# Patient Record
Sex: FEMALE | Race: WHITE | Employment: OTHER | URBAN - NONMETROPOLITAN AREA
[De-identification: names, ages, dates, MRNs, and addresses within clinical notes are randomized per-mention and may not be internally consistent; named-entity substitution may affect disease eponyms.]

---

## 2017-06-19 ENCOUNTER — OFFICE VISIT (OUTPATIENT)
Dept: OBGYN | Facility: OTHER | Age: 22
End: 2017-06-19
Attending: OBSTETRICS & GYNECOLOGY

## 2017-06-19 VITALS
WEIGHT: 166 LBS | HEIGHT: 62 IN | SYSTOLIC BLOOD PRESSURE: 100 MMHG | DIASTOLIC BLOOD PRESSURE: 64 MMHG | BODY MASS INDEX: 30.55 KG/M2 | HEART RATE: 80 BPM

## 2017-06-19 DIAGNOSIS — Z00.00 ROUTINE GENERAL MEDICAL EXAMINATION AT A HEALTH CARE FACILITY: Primary | ICD-10-CM

## 2017-06-19 PROBLEM — Z90.49 STATUS POST CHOLECYSTECTOMY: Status: ACTIVE | Noted: 2017-06-19

## 2017-06-19 PROCEDURE — 87491 CHLMYD TRACH DNA AMP PROBE: CPT | Mod: 90 | Performed by: OBSTETRICS & GYNECOLOGY

## 2017-06-19 PROCEDURE — 87624 HPV HI-RISK TYP POOLED RSLT: CPT | Mod: 90 | Performed by: OBSTETRICS & GYNECOLOGY

## 2017-06-19 PROCEDURE — 99395 PREV VISIT EST AGE 18-39: CPT | Performed by: OBSTETRICS & GYNECOLOGY

## 2017-06-19 PROCEDURE — 88142 CYTOPATH C/V THIN LAYER: CPT | Performed by: OBSTETRICS & GYNECOLOGY

## 2017-06-19 PROCEDURE — 87591 N.GONORRHOEAE DNA AMP PROB: CPT | Mod: 90 | Performed by: OBSTETRICS & GYNECOLOGY

## 2017-06-19 PROCEDURE — 99000 SPECIMEN HANDLING OFFICE-LAB: CPT | Performed by: OBSTETRICS & GYNECOLOGY

## 2017-06-19 NOTE — PROGRESS NOTES
ANNUAL    Christelle is a 22 year old  female who presents for annual exam.   yc 4  LC 7#  Gdm n  hpt n baby with hearing loss  No LMP recorded. Patient is not currently having periods (Reason: IUD).  Menses: reg very light  pain n Contraception  Mirena.  Planning pregnancy: n  Concerns in addition to routine health maintenance?  n.  GYNECOLOGIC HISTORY:   Surgery: n  History sexually transmitted infections:No STD history   Safe?  y  STI testing offered?  y  History of abnormal Pap smear: y  Problems with sex? n  Family history of: breast CA: pgm   Uterine n ovarian CA: n   colon CA: mu    HEALTH MAINTENANCE:  Cholesterol: (No results found for: CHOL History of abnormal lipids: n  Mammo: n . History of abnormal Mammo:n   Regular Self Breast Exams: y  Calcium/Vitamin D or Vit: n  Exercise n    TSH: (  TSH   Date Value Ref Range Status   2013 1.48 0.34 - 4.82 mU/L Final    )  Pap; (  Lab Results   Component Value Date    PAP LSIL 2015    PAP LSIL 10/19/2015    )    HISTORY:  Obstetric History       T1      L1     SAB0   TAB0   Ectopic0   Multiple0   Live Births0       # Outcome Date GA Lbr Blaine/2nd Weight Sex Delivery Anes PTL Lv   1 Term 13 38w6d 08:19 / :39 6 lb 13 oz (3.09 kg) M Vag-Spont Local,TOPICAL,EPI N LANCE      Name: SINDY CORNEJO      Apgar1:  9               Apgar5: 10        Past Medical History:   Diagnosis Date     Supervision of other normal pregnancy     NIRAJ: 7/3/2013     Past Surgical History:   Procedure Laterality Date     CHOLECYSTECTOMY       Family History   Problem Relation Age of Onset     Cystic Fibrosis       FAMILY H/O     Musculoskeletal Disorder       family h/o muscular dystrophy     DIABETES No family hx of      Hypertension No family hx of      C.A.D. No family hx of      CEREBROVASCULAR DISEASE No family hx of      Autoimmune Disease No family hx of      Colon Cancer No family hx of      Breast Cancer No family hx of      Ovarian Cancer No  "family hx of      Social History     Social History     Marital status: Single     Spouse name: N/A     Number of children: N/A     Years of education: N/A     Social History Main Topics     Smoking status: Never Smoker     Smokeless tobacco: Not on file     Alcohol use No     Drug use: No     Sexual activity: Not on file     Other Topics Concern     Not on file     Social History Narrative       Current Outpatient Prescriptions:      levonorgestrel (MIRENA) 20 MCG/24HR IUD, 1 each (20 mcg) by Intrauterine route, Disp: 1 each, Rfl: 0     diphenhydrAMINE (BENADRYL) 25 MG tablet, Take 1-2 tablets by mouth every 6 hours as needed for itching., Disp: 60 tablet, Rfl: 1     EPINEPHrine (EPIPEN IJ), Inject  as directed., Disp: , Rfl:    No Known Allergies    Past medical, surgical, social and family history were reviewed and updated in [facility].    ROS:    C:     NEGATIVE for fever, chills, change in weight  I:       NEGATIVE for worrisome rashes, moles or lesions  E:     NEGATIVE for vision changes or irritation  E/M: NEGATIVE for ear, mouth and throat problems  R:     NEGATIVE for significant cough or SOB  CV:   NEGATIVE for chest pain, palpitations or peripheral edema  GI:     NEGATIVE for nausea, abdominal pain, heartburn, or change in bowel habits  :   NEGATIVE for frequency, dysuria, hematuria, vaginal discharge, or irregular bleeding,incontinence   M:     NEGATIVE for significant arthralgias or myalgia  N:      NEGATIVE for weakness, dizziness or paresthesias  E:      NEGATIVE for temperature intolerance, skin/hair changes  P:      NEGATIVE for changes in mood or affect.     EXAM:   /64  Pulse 80  Ht 5' 2\" (1.575 m)  Wt 166 lb (75.3 kg)  BMI 30.36 kg/m2   BMI: Body mass index is 30.36 kg/(m^2).  Constitutional: healthy, alert and no distress  Head: Normocephalic. No masses, lesions, tenderness or abnormalities  Neck: Neck supple. Trachea midline. No adenopathy. Thyroid symmetric, normal size. "   Cardiovascular: RRR.   Respiratory: lungs clear   Breast: Breasts reveal mild symmetric fibrocystic densities, but there are no dominant, discrete, fixed or suspicious masses found.  Gastrointestinal: Abdomen soft, non-tender, non-distended. No masses, organomegaly.  :  Vulva:  No external lesions, normal female hair distribution, no inguinal adenopathy.    Urethra:  Midline, non-tender, well supported, no discharge  Vagina:  Moist, pink, no abnormal discharge, no lesions  Cervix: clean string in place  Uterus:   Normal size,  , non-tender, freely mobile  Ovaries:  No masses appreciated  Rectal Exam: not done  Musculoskeletal: extremities normal  Skin: no suspicious lesions or rashes  Psychiatric: Affect appropriate, cooperative,mentation appears normal.     COUNSELING:   regular exercise  healthy diet/nutrition  Immunizations   contraception  family planning  Folic Acid Counseling     reports that she has never smoked. She does not have any smokeless tobacco history on file.  Tobacco Cessation Action Plan:   Body mass index is 30.36 kg/(m^2).  Weight management plan:   FRAX Risk Assessment    ASSESSMENT:  22 year old female with satisfactory annual exam  With hx abnormal pap  PLAN:   Pap,gc,ct  CheckMIIC  rto 1 year     Greater than 0 minutes spent discussing other than annual     Bri Funez MD

## 2017-06-19 NOTE — MR AVS SNAPSHOT
After Visit Summary   6/19/2017    Christelle Bliss    MRN: [id number]           Patient Information     Date Of Birth          1995        Visit Information        Provider Department      6/19/2017 9:30 AM Bri Funez MD [facility]        Today's Diagnoses     Routine general medical examination at a health care facility    -  1      Care Instructions    Immunization History   Administered Date(s) Administered     DPT 1995, 1995, 1995     DTAP (<7y) 09/11/1997, 09/12/2001     Hepatitis B 1994, 10/10/1996, 01/09/1997, 12/11/1997     MMR 06/08/1995, 09/11/1997, 07/10/2006     OPV 01/11/1996, 08/08/1996, 10/10/1996, 01/09/1997     Pedvax-hib 09/01/1997     Poliovirus, inactivated (IPV) 1994, 1994, 09/12/2001     TDAP Vaccine (Adacel) 07/10/2006, 04/16/2013     TDAP Vaccine (Boostrix) 04/13/2013     TRIHIBIT (DTAP/HIB, <7y) 06/08/1995     Tdap (Adacel,Boostrix) 01/01/2006     Tetramune (DtP/HIB) 08/08/1996, 10/10/1996, 01/09/1997     Gardasil offered.    Pap test and Gonorrhea and Chlamydia testing done.  Return for annual exam in 1 year            Follow-ups after your visit        Who to contact     If you have questions or need follow up information about today's clinic visit or your schedule please contact [facility] directly at [phone].  Normal or non-critical lab and imaging results will be communicated to you by MyChart, letter or phone within 4 business days after the clinic has received the results. If you do not hear from us within 7 days, please contact the clinic through MyChart or phone. If you have a critical or abnormal lab result, we will notify you by phone as soon as possible.  Submit refill requests through [employer] or call your pharmacy and they will forward the refill request to us. Please allow 3 business days for your refill to be completed.          Additional Information About Your Visit       "  MyChart Information     [employer] lets you send messages to your doctor, view your test results, renew your prescriptions, schedule appointments and more. To sign up, go to [email or web address] . Click on \"Log in\" on the left side of the screen, which will take you to the Welcome page. Then click on \"Sign up Now\" on the right side of the page.     You will be asked to enter the access code listed below, as well as some personal information. Please follow the directions to create your username and password.     Your access code is: [email or web address]  Expires: 2017  9:25 AM     Your access code will  in 90 days. If you need help or a new code, please call your [facility] clinic or [phone].        Care EveryWhere ID     This is your Care EveryWhere ID. This could be used by other organizations to access your [facility] medical records  [id number]        Your Vitals Were     Pulse Height BMI (Body Mass Index)             80 5' 2\" (1.575 m) 30.36 kg/m2          Blood Pressure from Last 3 Encounters:   17 100/64   11/23/15 90/64   10/19/15 114/72    Weight from Last 3 Encounters:   17 166 lb (75.3 kg)   11/23/15 167 lb (75.8 kg)   10/19/15 167 lb (75.8 kg)              We Performed the Following     A pap thin layer screen reflex to HPV if ASCUS - recommend age 25 - 29     CHLAMYDIA TRACHOMATIS PCR     NEISSERIA GONORRHOEA PCR        Primary Care Provider    [facility] Other Clinic                Thank you!     Thank you for choosing [facility] [place]  for your care. Our goal is always to provide you with excellent care. Hearing back from our patients is one way we can continue to improve our services. Please take a few minutes to complete the written survey that you may receive in the mail after your visit with us. Thank you!             Your Updated Medication List - Protect others around you: Learn how to safely use, store and throw away your medicines at www.disposemymeds.org.          This list " is accurate as of: 6/19/17 10:10 AM.  Always use your most recent med list.                   Brand Name Dispense Instructions for use    diphenhydrAMINE 25 MG tablet    BENADRYL    60 tablet    Take 1-2 tablets by mouth every 6 hours as needed for itching.       EPIPEN IJ      Inject  as directed.       levonorgestrel 20 MCG/24HR IUD    MIRENA    1 each    1 each (20 mcg) by Intrauterine route

## 2017-06-19 NOTE — PATIENT INSTRUCTIONS
Immunization History   Administered Date(s) Administered     DPT 1994, 1994, 1994     DTAP (<7y) 09/11/1997, 09/12/2001     Hepatitis B 1994, 10/10/1996, 01/09/1997, 12/11/1997     MMR 06/08/1995, 09/11/1997, 07/10/2006     OPV 01/11/1996, 08/08/1996, 10/10/1996, 01/09/1997     Pedvax-hib 09/01/1997     Poliovirus, inactivated (IPV) 1994, 1994, 09/12/2001     TDAP Vaccine (Adacel) 07/10/2006, 04/16/2013     TDAP Vaccine (Boostrix) 04/13/2013     TRIHIBIT (DTAP/HIB, <7y) 06/08/1995     Tdap (Adacel,Boostrix) 01/01/2006     Tetramune (DtP/HIB) 08/08/1996, 10/10/1996, 01/09/1997     Gardasil offered.    Pap test and Gonorrhea and Chlamydia testing done.  Return for annual exam in 1 year

## 2017-06-20 LAB
C TRACH DNA SPEC QL NAA+PROBE: NORMAL
N GONORRHOEA DNA SPEC QL NAA+PROBE: NORMAL
SPECIMEN SOURCE: NORMAL
SPECIMEN SOURCE: NORMAL

## 2017-06-23 ASSESSMENT — ANXIETY QUESTIONNAIRES
6. BECOMING EASILY ANNOYED OR IRRITABLE: NOT AT ALL
7. FEELING AFRAID AS IF SOMETHING AWFUL MIGHT HAPPEN: NOT AT ALL
5. BEING SO RESTLESS THAT IT IS HARD TO SIT STILL: NOT AT ALL
3. WORRYING TOO MUCH ABOUT DIFFERENT THINGS: SEVERAL DAYS
GAD7 TOTAL SCORE: 3
IF YOU CHECKED OFF ANY PROBLEMS ON THIS QUESTIONNAIRE, HOW DIFFICULT HAVE THESE PROBLEMS MADE IT FOR YOU TO DO YOUR WORK, TAKE CARE OF THINGS AT HOME, OR GET ALONG WITH OTHER PEOPLE: NOT DIFFICULT AT ALL
1. FEELING NERVOUS, ANXIOUS, OR ON EDGE: SEVERAL DAYS
2. NOT BEING ABLE TO STOP OR CONTROL WORRYING: NOT AT ALL

## 2017-06-23 ASSESSMENT — PATIENT HEALTH QUESTIONNAIRE - PHQ9: 5. POOR APPETITE OR OVEREATING: SEVERAL DAYS

## 2017-06-24 ASSESSMENT — PATIENT HEALTH QUESTIONNAIRE - PHQ9: SUM OF ALL RESPONSES TO PHQ QUESTIONS 1-9: 2

## 2017-06-24 ASSESSMENT — ANXIETY QUESTIONNAIRES: GAD7 TOTAL SCORE: 3

## 2017-06-26 PROBLEM — R87.610 PAPANICOLAOU SMEAR OF CERVIX WITH ATYPICAL SQUAMOUS CELLS OF UNDETERMINED SIGNIFICANCE (ASC-US): Status: ACTIVE | Noted: 2017-06-26

## 2017-06-26 LAB
COPATH REPORT: ABNORMAL
PAP: ABNORMAL

## 2017-06-27 LAB
FINAL DIAGNOSIS: NORMAL
HPV HR 12 DNA CVX QL NAA+PROBE: NEGATIVE
HPV16 DNA SPEC QL NAA+PROBE: NEGATIVE
HPV18 DNA SPEC QL NAA+PROBE: NEGATIVE
SPECIMEN DESCRIPTION: NORMAL

## 2017-11-07 ENCOUNTER — OFFICE VISIT (OUTPATIENT)
Dept: OBGYN | Facility: OTHER | Age: 22
End: 2017-11-07
Attending: OBSTETRICS & GYNECOLOGY

## 2017-11-07 VITALS
DIASTOLIC BLOOD PRESSURE: 70 MMHG | SYSTOLIC BLOOD PRESSURE: 110 MMHG | HEART RATE: 71 BPM | BODY MASS INDEX: 30.73 KG/M2 | WEIGHT: 168 LBS | OXYGEN SATURATION: 99 %

## 2017-11-07 DIAGNOSIS — Z30.09 FAMILY PLANNING: Primary | ICD-10-CM

## 2017-11-07 DIAGNOSIS — Z23 NEED FOR PROPHYLACTIC VACCINATION AND INOCULATION AGAINST INFLUENZA: ICD-10-CM

## 2017-11-07 PROCEDURE — 58301 REMOVE INTRAUTERINE DEVICE: CPT | Performed by: OBSTETRICS & GYNECOLOGY

## 2017-11-07 PROCEDURE — 90471 IMMUNIZATION ADMIN: CPT | Performed by: OBSTETRICS & GYNECOLOGY

## 2017-11-07 PROCEDURE — 99213 OFFICE O/P EST LOW 20 MIN: CPT | Mod: 25 | Performed by: OBSTETRICS & GYNECOLOGY

## 2017-11-07 PROCEDURE — 90686 IIV4 VACC NO PRSV 0.5 ML IM: CPT | Performed by: OBSTETRICS & GYNECOLOGY

## 2017-11-07 ASSESSMENT — PAIN SCALES - GENERAL: PAINLEVEL: NO PAIN (0)

## 2017-11-07 NOTE — MR AVS SNAPSHOT
"              After Visit Summary   11/7/2017    Christelle Bliss    MRN: [id number]           Patient Information     Date Of Birth          1995        Visit Information        Provider Department      11/7/2017 10:10 AM Bri Funez MD [facility] [place]        Today's Diagnoses     Family planning    -  1       Follow-ups after your visit        Your next 10 appointments already scheduled     Aug 28, 2018 10:00 AM CDT   (Arrive by 9:45 AM)   PHYSICAL with Bri Funez MD   [facility] ([facility] )    [address] [place] [address]   [phone]              Who to contact     If you have questions or need follow up information about today's clinic visit or your schedule please contact [facility] directly at [phone].  Normal or non-critical lab and imaging results will be communicated to you by MyChart, letter or phone within 4 business days after the clinic has received the results. If you do not hear from us within 7 days, please contact the clinic through MyChart or phone. If you have a critical or abnormal lab result, we will notify you by phone as soon as possible.  Submit refill requests through [employer] or call your pharmacy and they will forward the refill request to us. Please allow 3 business days for your refill to be completed.          Additional Information About Your Visit        MyChart Information     [employer] lets you send messages to your doctor, view your test results, renew your prescriptions, schedule appointments and more. To sign up, go to [email or web address] . Click on \"Log in\" on the left side of the screen, which will take you to the Welcome page. Then click on \"Sign up Now\" on the right side of the page.     You will be asked to enter the access code listed below, as well as some personal information. Please follow the directions to create your username and password.     Your access code is: " [email or web address]  Expires: 2018 10:44 AM     Your access code will  in 90 days. If you need help or a new code, please call your [facility] clinic or [phone].        Care EveryWhere ID     This is your Care EveryWhere ID. This could be used by other organizations to access your [facility] medical records  [id number]        Your Vitals Were     Pulse Pulse Oximetry BMI (Body Mass Index)             71 99% 30.73 kg/m2          Blood Pressure from Last 3 Encounters:   17 110/70   17 100/64   11/23/15 90/64    Weight from Last 3 Encounters:   17 168 lb (76.2 kg)   17 166 lb (75.3 kg)   11/23/15 167 lb (75.8 kg)              We Performed the Following     REMOVE INTRAUTERINE DEVICE        Primary Care Provider    Physician No Ref-Primary       NO REF-PRIMARY PHYSICIAN        Equal Access to Services     HORTENCIA LLOYD : Hadii aad ku hadasho Somalissa, waaxda luqadaha, qaybta kaalmada adeegyada, waxay celsoin jaqueline hood . So [facility] [phone].    ATENCIÓN: Si habla español, tiene a casper disposición servicios gratuitos de asistencia lingüística. Julien al [phone].    We comply with applicable federal civil rights laws and [place] laws. We do not discriminate on the basis of race, color, national origin, age, disability, sex, sexual orientation, or gender identity.            Thank you!     Thank you for choosing [facility] [place]  for your care. Our goal is always to provide you with excellent care. Hearing back from our patients is one way we can continue to improve our services. Please take a few minutes to complete the written survey that you may receive in the mail after your visit with us. Thank you!             Your Updated Medication List - Protect others around you: Learn how to safely use, store and throw away your medicines at www.disposemymeds.org.          This list is accurate as of: 17 10:44 AM.  Always use your most recent med list.                    Brand Name Dispense Instructions for use Diagnosis    diphenhydrAMINE 25 MG tablet    BENADRYL    60 tablet    Take 1-2 tablets by mouth every 6 hours as needed for itching.    Contact dermatitis       EPIPEN IJ      Inject  as directed.        levonorgestrel 20 MCG/24HR IUD    MIRENA    1 each    1 each (20 mcg) by Intrauterine route    Family planning,  (spontaneous vaginal delivery)

## 2017-11-07 NOTE — PROGRESS NOTES

## 2017-11-07 NOTE — PROGRESS NOTES
Christelle Bliss is a 22 year old female here for planning pregnancy  Folate discussed      O:   /70  Pulse 71  Wt 168 lb (76.2 kg)  SpO2 99%  BMI 30.73 kg/m2   aa  Procedure:  After verbal consent was obtained from the patient, IUD strings were grasped with ring forcep and IUD easily removed intact with minimal patient discomfort noted.  No bleeding noted.      A:  Family planning    P:  rto pregnant  Folate  Flu shot    Greater than 15 minutes were spent face to face counseling this patient in addition to procedure    Bri Funez MD

## 2017-11-07 NOTE — NURSING NOTE
"Chief Complaint   Patient presents with     planning pregnancy       Initial /70  Pulse 71  Wt 168 lb (76.2 kg)  SpO2 99%  BMI 30.73 kg/m2 Estimated body mass index is 30.73 kg/(m^2) as calculated from the following:    Height as of 6/19/17: 5' 2\" (1.575 m).    Weight as of this encounter: 168 lb (76.2 kg).  Medication Reconciliation: complete     Shanna Fountain      "

## 2018-08-30 ENCOUNTER — OFFICE VISIT (OUTPATIENT)
Dept: OBGYN | Facility: OTHER | Age: 23
End: 2018-08-30
Attending: ADVANCED PRACTICE MIDWIFE
Payer: MEDICAID

## 2018-08-30 VITALS
DIASTOLIC BLOOD PRESSURE: 70 MMHG | WEIGHT: 174 LBS | HEIGHT: 62 IN | BODY MASS INDEX: 32.02 KG/M2 | SYSTOLIC BLOOD PRESSURE: 102 MMHG

## 2018-08-30 DIAGNOSIS — Z12.4 ENCOUNTER FOR SCREENING FOR CERVICAL CANCER: ICD-10-CM

## 2018-08-30 DIAGNOSIS — Z01.419 WELL WOMAN EXAM WITH ROUTINE GYNECOLOGICAL EXAM: Primary | ICD-10-CM

## 2018-08-30 LAB
HCG UR QL: NEGATIVE
TSH SERPL DL<=0.005 MIU/L-ACNC: 1.48 MU/L (ref 0.4–4)

## 2018-08-30 PROCEDURE — 84443 ASSAY THYROID STIM HORMONE: CPT | Mod: ZL

## 2018-08-30 PROCEDURE — 36415 COLL VENOUS BLD VENIPUNCTURE: CPT | Mod: ZL

## 2018-08-30 PROCEDURE — G0123 SCREEN CERV/VAG THIN LAYER: HCPCS | Mod: ZL

## 2018-08-30 PROCEDURE — 81025 URINE PREGNANCY TEST: CPT | Mod: ZL

## 2018-08-30 PROCEDURE — 99385 PREV VISIT NEW AGE 18-39: CPT | Performed by: ADVANCED PRACTICE MIDWIFE

## 2018-08-30 PROCEDURE — G0463 HOSPITAL OUTPT CLINIC VISIT: HCPCS

## 2018-08-30 PROCEDURE — 99212 OFFICE O/P EST SF 10 MIN: CPT | Mod: 25 | Performed by: ADVANCED PRACTICE MIDWIFE

## 2018-08-30 RX ORDER — PRENATAL VIT/IRON FUM/FOLIC AC 27MG-0.8MG
1 TABLET ORAL DAILY
COMMUNITY

## 2018-08-30 ASSESSMENT — ANXIETY QUESTIONNAIRES
2. NOT BEING ABLE TO STOP OR CONTROL WORRYING: NOT AT ALL
3. WORRYING TOO MUCH ABOUT DIFFERENT THINGS: SEVERAL DAYS
5. BEING SO RESTLESS THAT IT IS HARD TO SIT STILL: NOT AT ALL
IF YOU CHECKED OFF ANY PROBLEMS ON THIS QUESTIONNAIRE, HOW DIFFICULT HAVE THESE PROBLEMS MADE IT FOR YOU TO DO YOUR WORK, TAKE CARE OF THINGS AT HOME, OR GET ALONG WITH OTHER PEOPLE: NOT DIFFICULT AT ALL
6. BECOMING EASILY ANNOYED OR IRRITABLE: NOT AT ALL
1. FEELING NERVOUS, ANXIOUS, OR ON EDGE: SEVERAL DAYS
7. FEELING AFRAID AS IF SOMETHING AWFUL MIGHT HAPPEN: SEVERAL DAYS
GAD7 TOTAL SCORE: 3
4. TROUBLE RELAXING: NOT AT ALL

## 2018-08-30 ASSESSMENT — PAIN SCALES - GENERAL: PAINLEVEL: NO PAIN (0)

## 2018-08-30 NOTE — PROGRESS NOTES
ANNUAL    Christelle is a 23 year old  female who presents for annual exam.   Youngest child 5  Largest Child 6 lb 14 oz  GDM n  Hypertension n  Patient's last menstrual period was 2018.  Menses:  Cycles 30-34 days When did they start 13  How many days bleed 5 days with 2-3 heavy pain mild cramps Contraception nothing.  Planning pregnancy: y  Concerns in addition to routine health maintenance?  fertility.  GYNECOLOGIC HISTORY:   Surgery: n  History sexually transmitted infections:No STD history   Safe?  y  STI testing offered?  y  History of abnormal Pap smear: n  Problems with sex? (bleeding/pain) n  Family history of: breast cancer: pgm   Uterine cancer: n ovarian cancer: n   colon cancer: n    HEALTH MAINTENANCE:  Cholesterol: (No results found for: CHOL History of abnormal lipids: n  Mammo: n . History of abnormal Mammo:na   Regular Self Breast Exams: y Calcium/Vitamin D or Vitamin: y Exercise n    TSH: (  TSH   Date Value Ref Range Status   2013 1.48 0.34 - 4.82 mU/L Final    )  Pap; (  Lab Results   Component Value Date    PAP ASC-US 2017    PAP LSIL 2015    PAP LSIL 10/19/2015    )    HISTORY:  Obstetric History       T1      L1     SAB0   TAB0   Ectopic0   Multiple0   Live Births1       # Outcome Date GA Lbr Blaine/2nd Weight Sex Delivery Anes PTL Lv   1 Term 13 38w6d 08:19 :39 6 lb 13 oz (3.09 kg) M Vag-Spont Local,TOPICAL,EPI N LANCE      Name: SINDY CORNEJO      Apgar1:  9               Apgar5: 10        Past Medical History:   Diagnosis Date     Supervision of other normal pregnancy     NIRAJ: 7/3/2013     Past Surgical History:   Procedure Laterality Date     CHOLECYSTECTOMY       Family History   Problem Relation Age of Onset     Cystic Fibrosis       FAMILY H/O     Musculoskeletal Disorder       family h/o muscular dystrophy     Diabetes No family hx of      Hypertension No family hx of      C.A.D. No family hx of      Cerebrovascular Disease No  family hx of      Autoimmune Disease No family hx of      Colon Cancer No family hx of      Breast Cancer No family hx of      Ovarian Cancer No family hx of      Social History     Social History     Marital status: Single     Spouse name: N/A     Number of children: N/A     Years of education: N/A     Social History Main Topics     Smoking status: Never Smoker     Smokeless tobacco: None     Alcohol use No     Drug use: No     Sexual activity: Not Asked     Other Topics Concern     None     Social History Narrative       Current Outpatient Prescriptions:      diphenhydrAMINE (BENADRYL) 25 MG tablet, Take 1-2 tablets by mouth every 6 hours as needed for itching., Disp: 60 tablet, Rfl: 1     EPINEPHrine (EPIPEN IJ), Inject  as directed., Disp: , Rfl:      Prenatal Vit-Fe Fumarate-FA (PRENATAL MULTIVITAMIN PLUS IRON) 27-0.8 MG TABS per tablet, Take 1 tablet by mouth daily, Disp: , Rfl:      Allergies   Allergen Reactions     Trees Difficulty breathing       Past medical, surgical, social and family history were reviewed and updated in [facility].    ROS:    CONSTITUTIONAL:     NEGATIVE for fever, chills, change in weight  INTEGUMENTARY/SKIN:       NEGATIVE for worrisome rashes, moles or lesions  EYES:     NEGATIVE for vision changes or irritation  ENT/MOUTH: NEGATIVE for ear, mouth and throat problems  RESP:     NEGATIVE for significant cough or SOB  CV:   NEGATIVE for chest pain, palpitations or peripheral edema  GI:     NEGATIVE for nausea, abdominal pain, heartburn, or change in bowel habits  :   NEGATIVE for frequency, dysuria, hematuria, vaginal discharge, or irregular bleeding,incontinence   MUSCULOSKELETAL:     NEGATIVE for significant arthralgias or myalgia  NEURO:      NEGATIVE for weakness, dizziness or paresthesias  ENDOCRINE:      NEGATIVE for temperature intolerance, skin/hair changes  PSYCHIATRIC:      NEGATIVE for changes in mood or affect.     EXAM:   /70 (BP Location: Right arm, Patient Position:  "Chair, Cuff Size: Adult Regular)  Ht 5' 2\" (1.575 m)  Wt 174 lb (78.9 kg)  LMP 08/06/2018  BMI 31.83 kg/m2   BMI: Body mass index is 31.83 kg/(m^2).  Constitutional: healthy, alert and no distress  Head: Normocephalic. No masses, lesions, tenderness or abnormalities  Neck: Neck supple. Trachea midline. No adenopathy. Thyroid symmetric, normal size.   Cardiovascular: RRR.   Respiratory: lungs clear   Breast: Breasts reveal mild symmetric fibrocystic densities, but there are no dominant, discrete, fixed or suspicious masses found.  Gastrointestinal: Abdomen soft, non-tender, non-distended. No masses, organomegaly.  :  Vulva:  No external lesions, normal female hair distribution, no inguinal adenopathy.    Urethra:  Midline, non-tender, well supported, no discharge  Vagina:  Moist, pink, no abnormal discharge, no lesions  Cervix: clean   Uterus:  Normal size, non-tender, freely mobile  Ovaries:  No masses appreciated  Rectal Exam: deferred  Musculoskeletal: extremities normal  Skin: no suspicious lesions or rashes  Psychiatric: Affect appropriate, cooperative,mentation appears normal.     COUNSELING:   regular exercise  healthy diet/nutrition  Immunizations   contraception  family planning  Folic Acid Counseling     reports that she has never smoked. She does not have any smokeless tobacco history on file.  Tobacco Cessation Action Plan: na  Body mass index is 31.83 kg/(m^2).  Weight management plan: Lower carb intake and increase exercise  FRAX Risk Assessment    ASSESSMENT:  23 year old female with satisfactory annual exam  Need of cervical cancer screening  Need breast cancer screening  No contraception  Desires pregnancy  Infertility concerns/Trying since November, 2017  Obesity  PLAN:   Pap with reflex High Risk hpv  Tsh  Urine hCG qualitative  Provider breast exam  Preconception education  Discussed weight management plan/Goal 10% loss  RTO in November for follow up if needed    Return to office: 1 year for " well woman and as needed    Total visit greater than 45 minutes 35 minutes spent discussing well woman care and prevention and 10 minutes spent discussing preconception planning including avoiding medication, drugs, and alcohol, undercooked meat, unpasteurized milk and cheeses, do not change cat litter, wear gloves for gardening, wash hands after gardening.  Take folic acid daily, weight loss management, schedule appointment after positive pregnancy test for prenatal care.     MAYCO Manley, CNM

## 2018-08-30 NOTE — PATIENT INSTRUCTIONS
Return to office in one year for well woman visit and as needed.    Thank you for allowing Wade MAO CNM and our OB team to participate in your care.  If you have a scheduling or an appointment question please contact [facility] Unit Coordinator at their direct line [phone].   ALL nursing questions or concerns can be directed to your OB nurse at: [phone] Liz Stephenson/Summer

## 2018-08-30 NOTE — NURSING NOTE
"Chief Complaint   Patient presents with     Gyn Exam       Initial /70 (BP Location: Right arm, Patient Position: Chair, Cuff Size: Adult Regular)  Ht 5' 2\" (1.575 m)  Wt 174 lb (78.9 kg)  LMP 08/06/2018  BMI 31.83 kg/m2 Estimated body mass index is 31.83 kg/(m^2) as calculated from the following:    Height as of this encounter: 5' 2\" (1.575 m).    Weight as of this encounter: 174 lb (78.9 kg).  Medication Reconciliation: complete    Seema Silva LPN    "

## 2018-08-31 ASSESSMENT — PATIENT HEALTH QUESTIONNAIRE - PHQ9: SUM OF ALL RESPONSES TO PHQ QUESTIONS 1-9: 2

## 2018-08-31 ASSESSMENT — ANXIETY QUESTIONNAIRES: GAD7 TOTAL SCORE: 3

## 2018-09-08 LAB
COPATH REPORT: NORMAL
PAP: NORMAL

## 2018-10-10 ENCOUNTER — TELEPHONE (OUTPATIENT)
Dept: OBGYN | Facility: OTHER | Age: 23
End: 2018-10-10

## 2018-10-10 DIAGNOSIS — Z32.01 PREGNANCY TEST POSITIVE: Primary | ICD-10-CM

## 2018-10-10 NOTE — TELEPHONE ENCOUNTER
Wade Bauer Patient:     Positive pregnancy test : Yes  Last Annual/ Pap: 2018      P:1  Vaginal or C/S:vaginal   LMP : 2018 GA: 4w3d  Prenatal vitamins?: Yes  Bleeding?: No  Cramping?: Yes, cramping on right side   1-sided pelvic pain?: No   Advised patient to be seen ASAP if any of the above symptoms.    Order pended for dating US.     Amarjit appt scheduled with Wade on @ 3:45

## 2018-10-30 ENCOUNTER — HOSPITAL ENCOUNTER (OUTPATIENT)
Dept: ULTRASOUND IMAGING | Facility: HOSPITAL | Age: 23
Discharge: HOME | End: 2018-10-30
Attending: ADVANCED PRACTICE MIDWIFE | Admitting: ADVANCED PRACTICE MIDWIFE
Payer: COMMERCIAL

## 2018-10-30 DIAGNOSIS — Z32.01 PREGNANCY TEST POSITIVE: ICD-10-CM

## 2018-10-30 PROCEDURE — 76801 OB US < 14 WKS SINGLE FETUS: CPT | Mod: TC

## 2018-11-20 ENCOUNTER — APPOINTMENT (OUTPATIENT)
Dept: LAB | Facility: OTHER | Age: 23
End: 2018-11-20
Attending: ADVANCED PRACTICE MIDWIFE
Payer: COMMERCIAL

## 2018-11-20 ENCOUNTER — PRENATAL OFFICE VISIT (OUTPATIENT)
Dept: OBGYN | Facility: OTHER | Age: 23
End: 2018-11-20
Attending: ADVANCED PRACTICE MIDWIFE
Payer: COMMERCIAL

## 2018-11-20 VITALS
SYSTOLIC BLOOD PRESSURE: 107 MMHG | BODY MASS INDEX: 32.39 KG/M2 | HEIGHT: 62 IN | WEIGHT: 176 LBS | DIASTOLIC BLOOD PRESSURE: 66 MMHG

## 2018-11-20 DIAGNOSIS — Z34.81 ENCOUNTER FOR SUPERVISION OF OTHER NORMAL PREGNANCY IN FIRST TRIMESTER: Primary | ICD-10-CM

## 2018-11-20 DIAGNOSIS — Z23 NEED FOR PROPHYLACTIC VACCINATION AND INOCULATION AGAINST INFLUENZA: ICD-10-CM

## 2018-11-20 DIAGNOSIS — Z34.80 ENCOUNTER FOR SUPERVISION OF OTHER NORMAL PREGNANCY, UNSPECIFIED TRIMESTER: ICD-10-CM

## 2018-11-20 DIAGNOSIS — R82.5 POSITIVE URINE DRUG SCREEN: ICD-10-CM

## 2018-11-20 LAB
ALBUMIN UR-MCNC: NEGATIVE MG/DL
APPEARANCE UR: CLEAR
BILIRUB UR QL STRIP: NEGATIVE
COLOR UR AUTO: YELLOW
ERYTHROCYTE [DISTWIDTH] IN BLOOD BY AUTOMATED COUNT: 12.5 % (ref 10–15)
GLUCOSE UR STRIP-MCNC: NEGATIVE MG/DL
HCT VFR BLD AUTO: 37.5 % (ref 35–47)
HGB BLD-MCNC: 12.7 G/DL (ref 11.7–15.7)
HGB UR QL STRIP: ABNORMAL
KETONES UR STRIP-MCNC: NEGATIVE MG/DL
LEUKOCYTE ESTERASE UR QL STRIP: NEGATIVE
MCH RBC QN AUTO: 31.4 PG (ref 26.5–33)
MCHC RBC AUTO-ENTMCNC: 33.9 G/DL (ref 31.5–36.5)
MCV RBC AUTO: 93 FL (ref 78–100)
NITRATE UR QL: NEGATIVE
PH UR STRIP: 6 PH (ref 5–7)
PLATELET # BLD AUTO: 267 10E9/L (ref 150–450)
RBC # BLD AUTO: 4.04 10E12/L (ref 3.8–5.2)
RBC #/AREA URNS AUTO: NORMAL /HPF
SOURCE: ABNORMAL
SP GR UR STRIP: 1.02 (ref 1–1.03)
SPECIMEN SOURCE: ABNORMAL
UROBILINOGEN UR STRIP-ACNC: 0.2 EU/DL (ref 0.2–1)
WBC # BLD AUTO: 8.1 10E9/L (ref 4–11)
WBC #/AREA URNS AUTO: NORMAL /HPF
WET PREP SPEC: ABNORMAL

## 2018-11-20 PROCEDURE — 90686 IIV4 VACC NO PRSV 0.5 ML IM: CPT | Performed by: ADVANCED PRACTICE MIDWIFE

## 2018-11-20 PROCEDURE — G0463 HOSPITAL OUTPT CLINIC VISIT: HCPCS

## 2018-11-20 PROCEDURE — G0499 HEPB SCREEN HIGH RISK INDIV: HCPCS | Mod: ZL | Performed by: ADVANCED PRACTICE MIDWIFE

## 2018-11-20 PROCEDURE — 86780 TREPONEMA PALLIDUM: CPT | Mod: ZL | Performed by: ADVANCED PRACTICE MIDWIFE

## 2018-11-20 PROCEDURE — 99207 ZZC FIRST OB VISIT: CPT | Performed by: ADVANCED PRACTICE MIDWIFE

## 2018-11-20 PROCEDURE — 85027 COMPLETE CBC AUTOMATED: CPT | Mod: ZL | Performed by: ADVANCED PRACTICE MIDWIFE

## 2018-11-20 PROCEDURE — G0463 HOSPITAL OUTPT CLINIC VISIT: HCPCS | Mod: 25

## 2018-11-20 PROCEDURE — 87591 N.GONORRHOEAE DNA AMP PROB: CPT | Mod: ZL | Performed by: ADVANCED PRACTICE MIDWIFE

## 2018-11-20 PROCEDURE — 86901 BLOOD TYPING SEROLOGIC RH(D): CPT | Mod: ZL | Performed by: ADVANCED PRACTICE MIDWIFE

## 2018-11-20 PROCEDURE — 86850 RBC ANTIBODY SCREEN: CPT | Mod: ZL | Performed by: ADVANCED PRACTICE MIDWIFE

## 2018-11-20 PROCEDURE — 99000 SPECIMEN HANDLING OFFICE-LAB: CPT | Performed by: ADVANCED PRACTICE MIDWIFE

## 2018-11-20 PROCEDURE — 86762 RUBELLA ANTIBODY: CPT | Mod: ZL | Performed by: ADVANCED PRACTICE MIDWIFE

## 2018-11-20 PROCEDURE — 87389 HIV-1 AG W/HIV-1&-2 AB AG IA: CPT | Mod: ZL | Performed by: ADVANCED PRACTICE MIDWIFE

## 2018-11-20 PROCEDURE — 36415 COLL VENOUS BLD VENIPUNCTURE: CPT | Mod: ZL | Performed by: ADVANCED PRACTICE MIDWIFE

## 2018-11-20 PROCEDURE — 87491 CHLMYD TRACH DNA AMP PROBE: CPT | Mod: ZL | Performed by: ADVANCED PRACTICE MIDWIFE

## 2018-11-20 PROCEDURE — 81001 URINALYSIS AUTO W/SCOPE: CPT | Mod: ZL | Performed by: ADVANCED PRACTICE MIDWIFE

## 2018-11-20 PROCEDURE — 90471 IMMUNIZATION ADMIN: CPT | Performed by: ADVANCED PRACTICE MIDWIFE

## 2018-11-20 PROCEDURE — 80349 CANNABINOIDS NATURAL: CPT | Mod: ZL | Performed by: ADVANCED PRACTICE MIDWIFE

## 2018-11-20 PROCEDURE — 80307 DRUG TEST PRSMV CHEM ANLYZR: CPT | Mod: ZL | Performed by: ADVANCED PRACTICE MIDWIFE

## 2018-11-20 PROCEDURE — 86900 BLOOD TYPING SEROLOGIC ABO: CPT | Mod: ZL | Performed by: ADVANCED PRACTICE MIDWIFE

## 2018-11-20 PROCEDURE — 87210 SMEAR WET MOUNT SALINE/INK: CPT | Mod: ZL | Performed by: ADVANCED PRACTICE MIDWIFE

## 2018-11-20 ASSESSMENT — ANXIETY QUESTIONNAIRES
1. FEELING NERVOUS, ANXIOUS, OR ON EDGE: NOT AT ALL
7. FEELING AFRAID AS IF SOMETHING AWFUL MIGHT HAPPEN: NOT AT ALL
5. BEING SO RESTLESS THAT IT IS HARD TO SIT STILL: NOT AT ALL
2. NOT BEING ABLE TO STOP OR CONTROL WORRYING: NOT AT ALL
4. TROUBLE RELAXING: NOT AT ALL
3. WORRYING TOO MUCH ABOUT DIFFERENT THINGS: NOT AT ALL
6. BECOMING EASILY ANNOYED OR IRRITABLE: NOT AT ALL
GAD7 TOTAL SCORE: 0

## 2018-11-20 ASSESSMENT — PATIENT HEALTH QUESTIONNAIRE - PHQ9: SUM OF ALL RESPONSES TO PHQ QUESTIONS 1-9: 3

## 2018-11-20 ASSESSMENT — PAIN SCALES - GENERAL: PAINLEVEL: NO PAIN (0)

## 2018-11-20 NOTE — PATIENT INSTRUCTIONS
Return to office in 4 weeks for prenatal care and as needed.    Thank you for allowing Wade MAO CNM and our OB team to participate in your care.  If you have a scheduling or an appointment question please contact [facility] Unit Coordinator at their direct line [phone].   ALL nursing questions or concerns can be directed to your OB nurse at: [phone] Liz Stephenson/Summer

## 2018-11-20 NOTE — MR AVS SNAPSHOT
After Visit Summary   11/20/2018    Christelle Bliss    MRN: [id number]           Patient Information     Date Of Birth          1995        Visit Information        Provider Department      11/20/2018 3:45 PM Wade Bauer APRN CNM [facility]        Today's Diagnoses     Encounter for supervision of other normal pregnancy in first trimester    -  1    Encounter for supervision of other normal pregnancy, unspecified trimester        Need for prophylactic vaccination and inoculation against influenza          Care Instructions    Return to office in 4 weeks for prenatal care and as needed.    Thank you for allowing Wade MAO CNM and our OB team to participate in your care.  If you have a scheduling or an appointment question please contact [facility] Unit Coordinator at their direct line [phone].   ALL nursing questions or concerns can be directed to your OB nurse at: [phone] - Seema/Summer               Follow-ups after your visit        Your next 10 appointments already scheduled     Dec 11, 2018  4:00 PM CST   (Arrive by 3:45 PM)   ESTABLISHED PRENATAL with MAYCO Smith CNM   [facility] ([facility] )    [address] [address] [phone]              Who to contact     If you have questions or need follow up information about today's clinic visit or your schedule please contact [facility] directly at [phone].  Normal or non-critical lab and imaging results will be communicated to you by MyChart, letter or phone within 4 business days after the clinic has received the results. If you do not hear from us within 7 days, please contact the clinic through MyChart or phone. If you have a critical or abnormal lab result, we will notify you by phone as soon as possible.  Submit refill requests through [employer] or call your pharmacy  "and they will forward the refill request to us. Please allow 3 business days for your refill to be completed.          Additional Information About Your Visit        Care EveryWhere ID     This is your Care EveryWhere ID. This could be used by other organizations to access your [place] medical records  [id number]        Your Vitals Were     Height Last Period BMI (Body Mass Index)             5' 2\" (1.575 m) 09/09/2018 32.19 kg/m2          Blood Pressure from Last 3 Encounters:   11/20/18 107/66   08/30/18 102/70   11/07/17 110/70    Weight from Last 3 Encounters:   11/20/18 176 lb (79.8 kg)   08/30/18 174 lb (78.9 kg)   11/07/17 168 lb (76.2 kg)              We Performed the Following     *UA reflex to Microscopic and Culture - [facility]/[place]     ABO/Rh type and screen     ADMIN 1st VACCINE     CBC with platelets     Drug Screen Urine (Range)     GC/Chlamydia by PCR - [facility]     HC FLU VAC PRESRV FREE QUAD SPLIT VIR 3+YRS IM     Hepatitis B surface antigen     HIV Antigen Antibody Combo     Rubella Antibody IgG Quantitative     Treponema Abs w Reflex to RPR and Titer     Urine Microscopic     Wet prep        Primary Care Provider Fax #    Physician No Ref-Primary [phone]       No address on file        Equal Access to Services     HORTENCIA LLOYD AH: Hadii alexys ku dietero Somartyali, waaxda luqadaha, qaybta kaalmada adeegyada, nabeel lane. So [facility] [phone].    ATENCIÓN: Si habla español, tiene a casper disposición servicios gratuitos de asistencia lingüística. Llame al [phone].    We comply with applicable federal civil rights laws and [place] laws. We do not discriminate on the basis of race, color, national origin, age, disability, sex, sexual orientation, or gender identity.            Thank you!     Thank you for choosing [facility]  for your care. Our goal is always to provide you with excellent care. Hearing back from our patients is one way we can " continue to improve our services. Please take a few minutes to complete the written survey that you may receive in the mail after your visit with us. Thank you!             Your Updated Medication List - Protect others around you: Learn how to safely use, store and throw away your medicines at www.disposemymeds.org.          This list is accurate as of 11/20/18  5:59 PM.  Always use your most recent med list.                   Brand Name Dispense Instructions for use Diagnosis    diphenhydrAMINE 25 MG tablet    BENADRYL    60 tablet    Take 1-2 tablets by mouth every 6 hours as needed for itching.    Contact dermatitis       EPIPEN IJ      Inject  as directed.        prenatal multivitamin plus iron 27-0.8 MG Tabs per tablet      Take 1 tablet by mouth daily

## 2018-11-20 NOTE — NURSING NOTE
"Chief Complaint   Patient presents with     Prenatal Care     10w2d       Initial /66 (BP Location: Right arm, Patient Position: Chair, Cuff Size: Adult Regular)  Ht 5' 2\" (1.575 m)  Wt 176 lb (79.8 kg)  LMP 09/09/2018  BMI 32.19 kg/m2 Estimated body mass index is 32.19 kg/(m^2) as calculated from the following:    Height as of this encounter: 5' 2\" (1.575 m).    Weight as of this encounter: 176 lb (79.8 kg).  Medication Reconciliation: complete    Seema Silva LPN    "

## 2018-11-20 NOTE — PROGRESS NOTES
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
## 2018-11-21 LAB
ABO + RH BLD: NORMAL
ABO + RH BLD: NORMAL
AMPHETAMINES UR QL SCN: NEGATIVE
BARBITURATES UR QL: NEGATIVE
BENZODIAZ UR QL: NEGATIVE
BLD GP AB SCN SERPL QL: NORMAL
BLOOD BANK CMNT PATIENT-IMP: NORMAL
C TRACH DNA SPEC QL PROBE+SIG AMP: NOT DETECTED
CANNABINOIDS UR QL SCN: POSITIVE
COCAINE UR QL: NEGATIVE
METHADONE UR QL SCN: NEGATIVE
N GONORRHOEA DNA SPEC QL PROBE+SIG AMP: NOT DETECTED
OPIATES UR QL SCN: NEGATIVE
PCP UR QL SCN: NEGATIVE
SPECIMEN EXP DATE BLD: NORMAL
SPECIMEN SOURCE: NORMAL

## 2018-11-21 ASSESSMENT — ANXIETY QUESTIONNAIRES: GAD7 TOTAL SCORE: 0

## 2018-11-23 LAB
HBV SURFACE AG SERPL QL IA: NONREACTIVE
HIV 1+2 AB+HIV1 P24 AG SERPL QL IA: NONREACTIVE
RUBV IGG SERPL IA-ACNC: 146 IU/ML
T PALLIDUM AB SER QL: NONREACTIVE

## 2018-11-28 LAB
CANNABINOIDS UR CFM-MCNC: 231 NG/ML
CARBOXYTHC/CREAT UR: 171 NG/MG{CREAT}
CREAT UR-MCNC: 135 MG/DL

## 2018-12-03 ENCOUNTER — HOSPITAL ENCOUNTER (OUTPATIENT)
Dept: ULTRASOUND IMAGING | Facility: HOSPITAL | Age: 23
Discharge: HOME | End: 2018-12-03
Attending: ADVANCED PRACTICE MIDWIFE | Admitting: ADVANCED PRACTICE MIDWIFE
Payer: COMMERCIAL

## 2018-12-03 DIAGNOSIS — Z34.81 ENCOUNTER FOR SUPERVISION OF OTHER NORMAL PREGNANCY IN FIRST TRIMESTER: ICD-10-CM

## 2018-12-03 PROCEDURE — 76801 OB US < 14 WKS SINGLE FETUS: CPT | Mod: TC

## 2018-12-03 PROCEDURE — 36415 COLL VENOUS BLD VENIPUNCTURE: CPT | Performed by: ADVANCED PRACTICE MIDWIFE

## 2018-12-03 PROCEDURE — 84704 HCG FREE BETACHAIN TEST: CPT | Performed by: ADVANCED PRACTICE MIDWIFE

## 2018-12-03 PROCEDURE — 84163 PAPPA SERUM: CPT | Performed by: ADVANCED PRACTICE MIDWIFE

## 2018-12-03 PROCEDURE — 82105 ALPHA-FETOPROTEIN SERUM: CPT | Performed by: ADVANCED PRACTICE MIDWIFE

## 2018-12-11 ENCOUNTER — PRENATAL OFFICE VISIT (OUTPATIENT)
Dept: OBGYN | Facility: OTHER | Age: 23
End: 2018-12-11
Attending: ADVANCED PRACTICE MIDWIFE
Payer: COMMERCIAL

## 2018-12-11 VITALS
DIASTOLIC BLOOD PRESSURE: 68 MMHG | BODY MASS INDEX: 32.2 KG/M2 | SYSTOLIC BLOOD PRESSURE: 104 MMHG | WEIGHT: 175 LBS | HEIGHT: 62 IN

## 2018-12-11 DIAGNOSIS — Z34.82 ENCOUNTER FOR SUPERVISION OF OTHER NORMAL PREGNANCY, SECOND TRIMESTER: Primary | ICD-10-CM

## 2018-12-11 PROCEDURE — 99207 ZZC PRENATAL VISIT: CPT | Performed by: ADVANCED PRACTICE MIDWIFE

## 2018-12-11 PROCEDURE — G0463 HOSPITAL OUTPT CLINIC VISIT: HCPCS

## 2018-12-11 RX ORDER — METOCLOPRAMIDE 10 MG/1
TABLET ORAL
COMMUNITY
Start: 2018-12-09 | End: 2019-03-28

## 2018-12-11 ASSESSMENT — MIFFLIN-ST. JEOR: SCORE: 1497.04

## 2018-12-11 ASSESSMENT — PAIN SCALES - GENERAL: PAINLEVEL: NO PAIN (0)

## 2018-12-11 NOTE — NURSING NOTE
"Chief Complaint   Patient presents with     Prenatal Care     13w2d       Initial /68 (BP Location: Right arm, Patient Position: Chair, Cuff Size: Adult Regular)   Ht 1.575 m (5' 2\")   Wt 79.4 kg (175 lb)   LMP 09/09/2018   BMI 32.01 kg/m   Estimated body mass index is 32.01 kg/m  as calculated from the following:    Height as of this encounter: 1.575 m (5' 2\").    Weight as of this encounter: 79.4 kg (175 lb).  Medication Reconciliation: complete    Seema Silva LPN    "

## 2018-12-11 NOTE — PROGRESS NOTES
Doing well.    Denies contractions, bleeding, or leakage of fluid.     Discussed:  Fetal movement, Superior Babies, 1st and 2nd tri screening    Plan:  AFP between 15-16 weeks    Return to office in 4 weeks for prenatal care and as needed.    MAYCO Manley, CNM

## 2018-12-12 NOTE — PATIENT INSTRUCTIONS
Return to office in 4 weeks for prenatal care and as needed.    Thank you for allowing Wade MAO CNM and our OB team to participate in your care.  If you have a scheduling or an appointment question please contact [facility] Unit Coordinator at their direct line [phone].   ALL nursing questions or concerns can be directed to your OB nurse at: [phone] Liz Stephenson/Summer

## 2018-12-17 LAB — FIRST TRIMESTER SCREEN BIOCHEM MARKERS: NORMAL

## 2018-12-26 DIAGNOSIS — Z34.81 ENCOUNTER FOR SUPERVISION OF OTHER NORMAL PREGNANCY IN FIRST TRIMESTER: ICD-10-CM

## 2018-12-26 PROCEDURE — 99000 SPECIMEN HANDLING OFFICE-LAB: CPT | Performed by: ADVANCED PRACTICE MIDWIFE

## 2018-12-26 PROCEDURE — 82105 ALPHA-FETOPROTEIN SERUM: CPT | Mod: ZL | Performed by: ADVANCED PRACTICE MIDWIFE

## 2018-12-26 PROCEDURE — 36415 COLL VENOUS BLD VENIPUNCTURE: CPT | Mod: ZL | Performed by: ADVANCED PRACTICE MIDWIFE

## 2018-12-30 LAB
# FETUSES US: NORMAL
# FETUSES: NORMAL
AFP ADJ MOM AMN: 1.97
AFP SERPL-MCNC: 53 NG/ML
AGE - REPORTED: 25.4 YR
CURRENT SMOKER: NO
CURRENT SMOKER: NO
DIABETES STATUS PATIENT: NO
FAMILY MEMBER DISEASES HX: NO
FAMILY MEMBER DISEASES HX: NO
GA METHOD: NORMAL
GA METHOD: NORMAL
GA: NORMAL WK
IDDM PATIENT QL: NO
INTEGRATED SCN PATIENT-IMP: NORMAL
LMP START DATE: NORMAL
MONOCHORIONIC TWINS: NO
SERVICE CMNT-IMP: NO
SPECIMEN DRAWN SERPL: NORMAL
VALPROIC/CARBAMAZEPINE STATUS: NO
WEIGHT UNITS: NORMAL

## 2019-01-08 ENCOUNTER — PRENATAL OFFICE VISIT (OUTPATIENT)
Dept: OBGYN | Facility: OTHER | Age: 24
End: 2019-01-08
Attending: ADVANCED PRACTICE MIDWIFE
Payer: COMMERCIAL

## 2019-01-08 VITALS
DIASTOLIC BLOOD PRESSURE: 60 MMHG | HEIGHT: 62 IN | WEIGHT: 176 LBS | BODY MASS INDEX: 32.39 KG/M2 | SYSTOLIC BLOOD PRESSURE: 98 MMHG

## 2019-01-08 DIAGNOSIS — Z34.82 ENCOUNTER FOR SUPERVISION OF OTHER NORMAL PREGNANCY, SECOND TRIMESTER: Primary | ICD-10-CM

## 2019-01-08 PROCEDURE — 99207 ZZC PRENATAL VISIT: CPT | Performed by: ADVANCED PRACTICE MIDWIFE

## 2019-01-08 PROCEDURE — G0463 HOSPITAL OUTPT CLINIC VISIT: HCPCS

## 2019-01-08 ASSESSMENT — PAIN SCALES - GENERAL: PAINLEVEL: NO PAIN (0)

## 2019-01-08 ASSESSMENT — MIFFLIN-ST. JEOR: SCORE: 1501.58

## 2019-01-08 NOTE — PROGRESS NOTES
Doing well.  fluttering  Denies contractions, bleeding, or leakage of fluid.     Discussed:  Anatomy US, fetal movement, dental/vision care, reviewed screening    Plan:  US on 2/5/19    Return to office in 4 weeks for prenatal care and as needed.    MAYCO Manley, CNM

## 2019-01-08 NOTE — PATIENT INSTRUCTIONS
Return to office in 4 weeks for prenatal care and as needed.    Thank you for allowing Wade MAO CNM and our OB team to participate in your care.  If you have a scheduling or an appointment question please contact [facility] Unit Coordinator at their direct line [phone].   ALL nursing questions or concerns can be directed to your OB nurse at: [phone] Liz Stephenson/Summer

## 2019-01-08 NOTE — NURSING NOTE
"Chief Complaint   Patient presents with     Prenatal Care     17w2d       Initial BP 98/60 (BP Location: Left arm, Patient Position: Chair, Cuff Size: Adult Regular)   Ht 1.575 m (5' 2\")   Wt 79.8 kg (176 lb)   LMP 09/09/2018   BMI 32.19 kg/m   Estimated body mass index is 32.19 kg/m  as calculated from the following:    Height as of this encounter: 1.575 m (5' 2\").    Weight as of this encounter: 79.8 kg (176 lb).  Medication Reconciliation: complete    Seema Silva LPN     "

## 2019-02-05 ENCOUNTER — OFFICE VISIT (OUTPATIENT)
Dept: MATERNAL FETAL MEDICINE | Facility: CLINIC | Age: 24
End: 2019-02-05
Attending: ADVANCED PRACTICE MIDWIFE
Payer: COMMERCIAL

## 2019-02-05 ENCOUNTER — HOSPITAL ENCOUNTER (OUTPATIENT)
Dept: ULTRASOUND IMAGING | Facility: HOSPITAL | Age: 24
Discharge: HOME | End: 2019-02-05
Attending: ADVANCED PRACTICE MIDWIFE | Admitting: ADVANCED PRACTICE MIDWIFE
Payer: COMMERCIAL

## 2019-02-05 ENCOUNTER — HOSPITAL ENCOUNTER (OUTPATIENT)
Dept: ULTRASOUND IMAGING | Facility: CLINIC | Age: 24
End: 2019-02-05
Attending: ADVANCED PRACTICE MIDWIFE
Payer: COMMERCIAL

## 2019-02-05 ENCOUNTER — PRENATAL OFFICE VISIT (OUTPATIENT)
Dept: OBGYN | Facility: OTHER | Age: 24
End: 2019-02-05
Attending: ADVANCED PRACTICE MIDWIFE
Payer: COMMERCIAL

## 2019-02-05 VITALS
HEIGHT: 62 IN | DIASTOLIC BLOOD PRESSURE: 67 MMHG | WEIGHT: 180 LBS | SYSTOLIC BLOOD PRESSURE: 102 MMHG | BODY MASS INDEX: 33.13 KG/M2

## 2019-02-05 DIAGNOSIS — Z34.82 ENCOUNTER FOR SUPERVISION OF OTHER NORMAL PREGNANCY, SECOND TRIMESTER: Primary | ICD-10-CM

## 2019-02-05 DIAGNOSIS — Z34.80 ENCOUNTER FOR SUPERVISION OF OTHER NORMAL PREGNANCY, UNSPECIFIED TRIMESTER: ICD-10-CM

## 2019-02-05 DIAGNOSIS — Z34.81 ENCOUNTER FOR SUPERVISION OF OTHER NORMAL PREGNANCY IN FIRST TRIMESTER: ICD-10-CM

## 2019-02-05 DIAGNOSIS — O35.EXX0 PYELECTASIS OF FETUS ON PRENATAL ULTRASOUND: ICD-10-CM

## 2019-02-05 DIAGNOSIS — Z36.89 ENCOUNTER FOR FETAL ANATOMIC SURVEY: Primary | ICD-10-CM

## 2019-02-05 PROCEDURE — 76811 OB US DETAILED SNGL FETUS: CPT | Mod: TC

## 2019-02-05 PROCEDURE — G0463 HOSPITAL OUTPT CLINIC VISIT: HCPCS | Mod: 25

## 2019-02-05 PROCEDURE — 99207 ZZC PRENATAL VISIT: CPT | Performed by: ADVANCED PRACTICE MIDWIFE

## 2019-02-05 PROCEDURE — G0463 HOSPITAL OUTPT CLINIC VISIT: HCPCS | Mod: 25,27

## 2019-02-05 PROCEDURE — G0463 HOSPITAL OUTPT CLINIC VISIT: HCPCS

## 2019-02-05 ASSESSMENT — PAIN SCALES - GENERAL: PAINLEVEL: NO PAIN (0)

## 2019-02-05 ASSESSMENT — MIFFLIN-ST. JEOR: SCORE: 1514.72

## 2019-02-05 NOTE — PROGRESS NOTES
Doing well.  Baby active.   Denies contractions, bleeding, or leakage of fluid.     Discussed:  Reviewed US, fetal movement, Superior Babies visit    Plan:  Repeat US needs to be scheduled    Return to office in 4 weeks for prenatal care and as needed.    MAYCO Manley, CNM

## 2019-02-05 NOTE — PATIENT INSTRUCTIONS
Return to office in 4 weeks for prenatal care and as needed.    Thank you for allowing Wade MAO CNM and our OB team to participate in your care.  If you have a scheduling or an appointment question please contact [facility] Unit Coordinator at their direct line [phone].   ALL nursing questions or concerns can be directed to your OB nurse at: [phone] Liz Stephenson/Summer

## 2019-02-05 NOTE — NURSING NOTE
"Chief Complaint   Patient presents with     Prenatal Care     21w2d       Initial /67 (BP Location: Left arm, Patient Position: Chair, Cuff Size: Adult Large)   Ht 1.575 m (5' 2\")   Wt 81.6 kg (180 lb)   LMP 09/09/2018   BMI 32.92 kg/m   Estimated body mass index is 32.92 kg/m  as calculated from the following:    Height as of this encounter: 1.575 m (5' 2\").    Weight as of this encounter: 81.6 kg (180 lb).  Medication Reconciliation: complete    Seema Silva LPN     "

## 2019-02-07 DIAGNOSIS — Z34.80 ENCOUNTER FOR SUPERVISION OF OTHER NORMAL PREGNANCY, UNSPECIFIED TRIMESTER: Primary | ICD-10-CM

## 2019-02-07 NOTE — PROGRESS NOTES
Type of service:    Telemedicine Office Visit for fetal ultrasound    Date of service:     Date: 2/5/19     Time service began:    9:00 AM  Time service ended:    10:00 AM    Reason:      .tel: Patient unable to travel    Description of basis or telemedicine appropriateness:     Consultation provided at the request of Dr. Bauer for advice regarding the diagnosis and treatment of this patient's pregnancy.  The patient's condition can be safely assessed via telemedicine.    The Mode of Transmission:    Secure interactive audio and visual telecommunication system (Video [employer])    Location of originating and distant sites:      Originating site:   [place], [place]    Distant site:    [place], [place]    Kaylie Brady DO

## 2019-02-26 ENCOUNTER — PRENATAL OFFICE VISIT (OUTPATIENT)
Dept: OBGYN | Facility: OTHER | Age: 24
End: 2019-02-26
Attending: ADVANCED PRACTICE MIDWIFE
Payer: COMMERCIAL

## 2019-02-26 VITALS
DIASTOLIC BLOOD PRESSURE: 62 MMHG | BODY MASS INDEX: 33.49 KG/M2 | WEIGHT: 182 LBS | SYSTOLIC BLOOD PRESSURE: 104 MMHG | HEIGHT: 62 IN

## 2019-02-26 DIAGNOSIS — Z34.82 ENCOUNTER FOR SUPERVISION OF OTHER NORMAL PREGNANCY, SECOND TRIMESTER: Primary | ICD-10-CM

## 2019-02-26 PROCEDURE — G0463 HOSPITAL OUTPT CLINIC VISIT: HCPCS

## 2019-02-26 PROCEDURE — 99207 ZZC PRENATAL VISIT: CPT | Performed by: ADVANCED PRACTICE MIDWIFE

## 2019-02-26 ASSESSMENT — PAIN SCALES - GENERAL: PAINLEVEL: NO PAIN (0)

## 2019-02-26 ASSESSMENT — MIFFLIN-ST. JEOR: SCORE: 1523.8

## 2019-02-26 NOTE — PATIENT INSTRUCTIONS
Return to office in 4 weeks for prenatal care and as needed.    Thank you for allowing Wade MAO CNM and our OB team to participate in your care.  If you have a scheduling or an appointment question please contact [facility] Unit Coordinator at their direct line [phone].   ALL nursing questions or concerns can be directed to your OB nurse at: [phone] Liz Stephenson/Summer

## 2019-02-26 NOTE — NURSING NOTE
"Chief Complaint   Patient presents with     Prenatal Care     24w2d       Initial /62 (BP Location: Right arm, Patient Position: Chair, Cuff Size: Adult Regular)   Ht 1.575 m (5' 2\")   Wt 82.6 kg (182 lb)   LMP 09/09/2018   BMI 33.29 kg/m   Estimated body mass index is 33.29 kg/m  as calculated from the following:    Height as of this encounter: 1.575 m (5' 2\").    Weight as of this encounter: 82.6 kg (182 lb).  Medication Reconciliation: complete    Seema Silva LPN    "

## 2019-02-26 NOTE — PROGRESS NOTES
Doing well.  Baby active.   Denies contractions, bleeding, or leakage of fluid. BH x 3    Discussed:  Heartburn, safe meds, repeat US, 3rd tri labs, 1 hr gtt, Tdap, fetal movement    Plan:  Next visit:   3rd tri labs   1 Hr GTT   Tdap    anticipatory guidance    Return to office in 4 weeks for prenatal care and as needed.    MAYCO Manley, CNM

## 2019-03-26 ENCOUNTER — OFFICE VISIT (OUTPATIENT)
Dept: FAMILY MEDICINE | Facility: OTHER | Age: 24
End: 2019-03-26
Attending: ADVANCED PRACTICE MIDWIFE
Payer: COMMERCIAL

## 2019-03-26 DIAGNOSIS — Z23 NEED FOR TDAP VACCINATION: Primary | ICD-10-CM

## 2019-03-26 DIAGNOSIS — Z34.80 ENCOUNTER FOR SUPERVISION OF OTHER NORMAL PREGNANCY, UNSPECIFIED TRIMESTER: Primary | ICD-10-CM

## 2019-03-26 DIAGNOSIS — Z34.80 ENCOUNTER FOR SUPERVISION OF OTHER NORMAL PREGNANCY, UNSPECIFIED TRIMESTER: ICD-10-CM

## 2019-03-26 LAB
ALBUMIN UR-MCNC: NEGATIVE MG/DL
APPEARANCE UR: ABNORMAL
BILIRUB UR QL STRIP: NEGATIVE
COLOR UR AUTO: YELLOW
ERYTHROCYTE [DISTWIDTH] IN BLOOD BY AUTOMATED COUNT: 13 % (ref 10–15)
GLUCOSE UR STRIP-MCNC: NEGATIVE MG/DL
HCT VFR BLD AUTO: 31.7 % (ref 35–47)
HGB BLD-MCNC: 10.5 G/DL (ref 11.7–15.7)
HGB UR QL STRIP: NEGATIVE
KETONES UR STRIP-MCNC: NEGATIVE MG/DL
LEUKOCYTE ESTERASE UR QL STRIP: ABNORMAL
MCH RBC QN AUTO: 30.8 PG (ref 26.5–33)
MCHC RBC AUTO-ENTMCNC: 33.1 G/DL (ref 31.5–36.5)
MCV RBC AUTO: 93 FL (ref 78–100)
NITRATE UR QL: NEGATIVE
PH UR STRIP: 7 PH (ref 5–7)
PLATELET # BLD AUTO: 256 10E9/L (ref 150–450)
RBC # BLD AUTO: 3.41 10E12/L (ref 3.8–5.2)
RBC #/AREA URNS AUTO: NORMAL /HPF
SOURCE: ABNORMAL
SP GR UR STRIP: 1.02 (ref 1–1.03)
UROBILINOGEN UR STRIP-ACNC: 1 EU/DL (ref 0.2–1)
WBC # BLD AUTO: 9.6 10E9/L (ref 4–11)
WBC #/AREA URNS AUTO: NORMAL /HPF

## 2019-03-26 PROCEDURE — 90715 TDAP VACCINE 7 YRS/> IM: CPT

## 2019-03-26 PROCEDURE — 81001 URINALYSIS AUTO W/SCOPE: CPT | Mod: ZL | Performed by: ADVANCED PRACTICE MIDWIFE

## 2019-03-26 PROCEDURE — 85027 COMPLETE CBC AUTOMATED: CPT | Mod: ZL | Performed by: ADVANCED PRACTICE MIDWIFE

## 2019-03-26 PROCEDURE — 99000 SPECIMEN HANDLING OFFICE-LAB: CPT | Performed by: ADVANCED PRACTICE MIDWIFE

## 2019-03-26 PROCEDURE — 82950 GLUCOSE TEST: CPT | Mod: ZL | Performed by: ADVANCED PRACTICE MIDWIFE

## 2019-03-26 PROCEDURE — 36415 COLL VENOUS BLD VENIPUNCTURE: CPT | Mod: ZL | Performed by: ADVANCED PRACTICE MIDWIFE

## 2019-03-26 PROCEDURE — 0064U ANTB TP TOTAL&RPR IA QUAL: CPT | Mod: ZL | Performed by: ADVANCED PRACTICE MIDWIFE

## 2019-03-26 PROCEDURE — 90471 IMMUNIZATION ADMIN: CPT

## 2019-03-27 LAB — GLUCOSE 1H P 50 G GLC PO SERPL-MCNC: 106 MG/DL (ref 60–129)

## 2019-03-28 ENCOUNTER — PRENATAL OFFICE VISIT (OUTPATIENT)
Dept: OBGYN | Facility: OTHER | Age: 24
End: 2019-03-28
Attending: ADVANCED PRACTICE MIDWIFE
Payer: COMMERCIAL

## 2019-03-28 VITALS
HEIGHT: 62 IN | SYSTOLIC BLOOD PRESSURE: 94 MMHG | WEIGHT: 189.1 LBS | DIASTOLIC BLOOD PRESSURE: 58 MMHG | BODY MASS INDEX: 34.8 KG/M2

## 2019-03-28 DIAGNOSIS — Z34.83 ENCOUNTER FOR SUPERVISION OF OTHER NORMAL PREGNANCY, THIRD TRIMESTER: Primary | ICD-10-CM

## 2019-03-28 DIAGNOSIS — Z34.80 ENCOUNTER FOR SUPERVISION OF OTHER NORMAL PREGNANCY, UNSPECIFIED TRIMESTER: ICD-10-CM

## 2019-03-28 LAB — T PALLIDUM AB SER QL: NONREACTIVE

## 2019-03-28 PROCEDURE — G0463 HOSPITAL OUTPT CLINIC VISIT: HCPCS

## 2019-03-28 PROCEDURE — 99207 ZZC PRENATAL VISIT: CPT | Performed by: ADVANCED PRACTICE MIDWIFE

## 2019-03-28 ASSESSMENT — MIFFLIN-ST. JEOR: SCORE: 1556

## 2019-03-28 ASSESSMENT — PAIN SCALES - GENERAL: PAINLEVEL: NO PAIN (0)

## 2019-03-28 NOTE — NURSING NOTE
"Chief Complaint   Patient presents with     Prenatal Care     28 weeks 4/7 days       Initial BP 94/58   Ht 1.575 m (5' 2\")   Wt 85.8 kg (189 lb 1.6 oz)   LMP 09/09/2018   BMI 34.59 kg/m   Estimated body mass index is 34.59 kg/m  as calculated from the following:    Height as of this encounter: 1.575 m (5' 2\").    Weight as of this encounter: 85.8 kg (189 lb 1.6 oz).  Medication Reconciliation: complete    Lakeisha Ojeda LPN    "

## 2019-03-28 NOTE — PROGRESS NOTES
Doing well.  Baby active.   Denies contractions, bleeding, or leakage of fluid.     Discussed:  Lab results, mild carpal tunnel, kick count  Anticipatory Guidance  care in hospital  Respiratory therapy called for all deliveries  Skin to skin  Immunizations/meds   -Hepatitis B   -Erythromycin   -Vitamin K  Screening   -Hearing   -CCHD   -Bilirubin   -Metabolic  Rooming in  Feeding:  Breast  Car seats  Provider/appointments     Plan:  US on 19     Return to office in 2 weeks for prenatal care and as needed.    Wade Bauer, MAYCO, CNM

## 2019-03-28 NOTE — PATIENT INSTRUCTIONS
Return to office in 2 week(s) for prenatal care and as needed.    If you think your bag of water is broke; have bleeding like a period; think your in labor; or are worried about your baby's movement; please call the labor and delivery unit @ [phone].    Thank you for allowing Wade MAO CNM and our OB team to participate in your care.  If you have a scheduling or an appointment question please contact [facility] Unit Coordinator at their direct line [phone].   ALL nursing questions or concerns can be directed to your OB nurse at: [phone] Liz Stephenson/Summer

## 2019-04-09 ENCOUNTER — PRENATAL OFFICE VISIT (OUTPATIENT)
Dept: OBGYN | Facility: OTHER | Age: 24
End: 2019-04-09
Attending: ADVANCED PRACTICE MIDWIFE
Payer: COMMERCIAL

## 2019-04-09 VITALS
HEIGHT: 62 IN | SYSTOLIC BLOOD PRESSURE: 100 MMHG | DIASTOLIC BLOOD PRESSURE: 62 MMHG | WEIGHT: 190 LBS | BODY MASS INDEX: 34.96 KG/M2

## 2019-04-09 DIAGNOSIS — Z34.83 ENCOUNTER FOR SUPERVISION OF OTHER NORMAL PREGNANCY, THIRD TRIMESTER: Primary | ICD-10-CM

## 2019-04-09 PROCEDURE — 99207 ZZC PRENATAL VISIT: CPT | Performed by: ADVANCED PRACTICE MIDWIFE

## 2019-04-09 PROCEDURE — G0463 HOSPITAL OUTPT CLINIC VISIT: HCPCS

## 2019-04-09 ASSESSMENT — MIFFLIN-ST. JEOR: SCORE: 1560.08

## 2019-04-09 ASSESSMENT — PAIN SCALES - GENERAL: PAINLEVEL: NO PAIN (0)

## 2019-04-09 NOTE — NURSING NOTE
"Chief Complaint   Patient presents with     Prenatal Care     30w2d       Initial /62 (BP Location: Left arm, Patient Position: Chair, Cuff Size: Adult Regular)   Ht 1.575 m (5' 2\")   Wt 86.2 kg (190 lb)   LMP 09/09/2018   BMI 34.75 kg/m   Estimated body mass index is 34.75 kg/m  as calculated from the following:    Height as of this encounter: 1.575 m (5' 2\").    Weight as of this encounter: 86.2 kg (190 lb).  Medication Reconciliation: complete    Seema Silva LPN    "

## 2019-04-09 NOTE — PATIENT INSTRUCTIONS
Return to office in 2 week(s) for prenatal care and as needed.    If you think your bag of water is broke; have bleeding like a period; think your in labor; or are worried about your baby's movement; please call the labor and delivery unit @ [phone].    Thank you for allowing Wade MAO CNM and our OB team to participate in your care.  If you have a scheduling or an appointment question please contact [facility] Unit Coordinator at their direct line [phone].   ALL nursing questions or concerns can be directed to your OB nurse at: [phone] Liz Stephenson/Summer

## 2019-04-09 NOTE — PROGRESS NOTES
Doing well.  Baby active.   Denies  bleeding, or leakage of fluid. Some contractions    Discussed:  Repeat US, pain management, kick count    Plan:  US on 4/23/19 repeat for marginal cord    Return to office in 2 weeks for prenatal care and as needed.    Wade Bauer, MAYCO, CNM

## 2019-04-23 ENCOUNTER — OFFICE VISIT (OUTPATIENT)
Dept: MATERNAL FETAL MEDICINE | Facility: CLINIC | Age: 24
End: 2019-04-23
Attending: ADVANCED PRACTICE MIDWIFE
Payer: COMMERCIAL

## 2019-04-23 ENCOUNTER — PRENATAL OFFICE VISIT (OUTPATIENT)
Dept: OBGYN | Facility: OTHER | Age: 24
End: 2019-04-23
Attending: ADVANCED PRACTICE MIDWIFE
Payer: COMMERCIAL

## 2019-04-23 ENCOUNTER — HOSPITAL ENCOUNTER (OUTPATIENT)
Dept: ULTRASOUND IMAGING | Facility: HOSPITAL | Age: 24
End: 2019-04-23
Attending: ADVANCED PRACTICE MIDWIFE
Payer: COMMERCIAL

## 2019-04-23 ENCOUNTER — HOSPITAL ENCOUNTER (OUTPATIENT)
Dept: ULTRASOUND IMAGING | Facility: CLINIC | Age: 24
End: 2019-04-23
Attending: ADVANCED PRACTICE MIDWIFE
Payer: COMMERCIAL

## 2019-04-23 VITALS
BODY MASS INDEX: 35.15 KG/M2 | SYSTOLIC BLOOD PRESSURE: 104 MMHG | HEART RATE: 104 BPM | TEMPERATURE: 97.4 F | WEIGHT: 191 LBS | RESPIRATION RATE: 14 BRPM | DIASTOLIC BLOOD PRESSURE: 68 MMHG | HEIGHT: 62 IN | OXYGEN SATURATION: 98 %

## 2019-04-23 DIAGNOSIS — Z34.83 ENCOUNTER FOR SUPERVISION OF OTHER NORMAL PREGNANCY, THIRD TRIMESTER: Primary | ICD-10-CM

## 2019-04-23 DIAGNOSIS — Z34.80 ENCOUNTER FOR SUPERVISION OF OTHER NORMAL PREGNANCY, UNSPECIFIED TRIMESTER: ICD-10-CM

## 2019-04-23 DIAGNOSIS — O35.EXX0 ENCOUNTER FOR REPEAT ULTRASOUND OF FETAL PYELECTASIS, ANTEPARTUM, SINGLE OR UNSPECIFIED FETUS: Primary | ICD-10-CM

## 2019-04-23 PROCEDURE — 76811 OB US DETAILED SNGL FETUS: CPT | Mod: TC

## 2019-04-23 PROCEDURE — 99207 ZZC PRENATAL VISIT: CPT | Performed by: ADVANCED PRACTICE MIDWIFE

## 2019-04-23 ASSESSMENT — MIFFLIN-ST. JEOR: SCORE: 1564.62

## 2019-04-23 ASSESSMENT — PAIN SCALES - GENERAL: PAINLEVEL: NO PAIN (0)

## 2019-04-23 NOTE — PROGRESS NOTES
Doing well.  Baby active.   Denies contractions, bleeding, or leakage of fluid.     Discussed:  US reviewed, both marginal cord and renal pelvis issue resolved, PTL, kick count    Plan:  GBS PCR @ 36 w    Return to office in 2 weeks for prenatal care and as needed.    MAYCO Manley, CNM

## 2019-04-23 NOTE — NURSING NOTE
"Chief Complaint   Patient presents with     Prenatal Care     32 weeks, 2 days       Initial /68 (BP Location: Left arm, Patient Position: Sitting, Cuff Size: Adult Regular)   Pulse 104   Temp 97.4  F (36.3  C) (Tympanic)   Resp 14   Ht 1.575 m (5' 2\")   Wt 86.6 kg (191 lb)   LMP 09/09/2018   SpO2 98%   BMI 34.93 kg/m   Estimated body mass index is 34.93 kg/m  as calculated from the following:    Height as of this encounter: 1.575 m (5' 2\").    Weight as of this encounter: 86.6 kg (191 lb).  Medication Reconciliation: complete    Magdalena Howell LPN    "

## 2019-04-23 NOTE — PATIENT INSTRUCTIONS
Return to office in 2 week(s) for prenatal care and as needed.    If you think your bag of water is broke; have bleeding like a period; think your in labor; or are worried about your baby's movement; please call the labor and delivery unit @ [phone].    Thank you for allowing Wade MAO CNM and our OB team to participate in your care.  If you have a scheduling or an appointment question please contact [facility] Unit Coordinator at their direct line [phone].   ALL nursing questions or concerns can be directed to your OB nurse at: [phone] Liz Stephenson/Summer

## 2019-04-23 NOTE — PROGRESS NOTES
Type of service:     Fetal growth and follow-up anatomy survey    Date of service:     4/23/19     Time service began:    7:52 AM  Time service ended:    8:25 AM    Reason:      Patient conveience    Description of basis or telemedicine appropriateness:     Consultation provided at the request of Ms. Bauer for advice regarding the diagnosis and treatment of this patient's prengnacy.  The patient's condition can be safely assessed via telemedicine.    The Mode of Transmission:    Secure interactive audio and visual telecommunication system (Video Guidance)    Location of originating and distant sites:      Originating site:   [place], [place]    Distant site:   [place], [place]        Jahaira Schafer MD  , OB/GYN  Maternal-Fetal Medicine  [email or web address]  [phone] (Academic office)  [phone] (Pager)

## 2019-05-07 ENCOUNTER — PRENATAL OFFICE VISIT (OUTPATIENT)
Dept: OBGYN | Facility: OTHER | Age: 24
End: 2019-05-07
Attending: ADVANCED PRACTICE MIDWIFE
Payer: COMMERCIAL

## 2019-05-07 VITALS
BODY MASS INDEX: 35.33 KG/M2 | SYSTOLIC BLOOD PRESSURE: 98 MMHG | HEIGHT: 62 IN | WEIGHT: 192 LBS | DIASTOLIC BLOOD PRESSURE: 52 MMHG

## 2019-05-07 DIAGNOSIS — Z34.83 ENCOUNTER FOR SUPERVISION OF OTHER NORMAL PREGNANCY, THIRD TRIMESTER: Primary | ICD-10-CM

## 2019-05-07 PROCEDURE — 99207 ZZC PRENATAL VISIT: CPT | Performed by: ADVANCED PRACTICE MIDWIFE

## 2019-05-07 PROCEDURE — G0463 HOSPITAL OUTPT CLINIC VISIT: HCPCS

## 2019-05-07 ASSESSMENT — PAIN SCALES - GENERAL: PAINLEVEL: NO PAIN (0)

## 2019-05-07 ASSESSMENT — MIFFLIN-ST. JEOR: SCORE: 1569.16

## 2019-05-07 NOTE — PATIENT INSTRUCTIONS
Return to office in 1 week(s) for prenatal care and as needed.    If you think your bag of water is broke; have bleeding like a period; think your in labor; or are worried about your baby's movement; please call the labor and delivery unit @ [phone].    Thank you for allowing Wade MAO CNM and our OB team to participate in your care.  If you have a scheduling or an appointment question please contact [facility] Unit Coordinator at their direct line [phone].   ALL nursing questions or concerns can be directed to your OB nurse at: [phone] Liz Stephenson/Summer

## 2019-05-07 NOTE — NURSING NOTE
"Chief Complaint   Patient presents with     Prenatal Care     34w2d       Initial BP 98/52 (BP Location: Left arm, Patient Position: Chair, Cuff Size: Adult Regular)   Ht 1.575 m (5' 2\")   Wt 87.1 kg (192 lb)   LMP 09/09/2018   BMI 35.12 kg/m   Estimated body mass index is 35.12 kg/m  as calculated from the following:    Height as of this encounter: 1.575 m (5' 2\").    Weight as of this encounter: 87.1 kg (192 lb).  Medication Reconciliation: complete    Seema Silva LPN    "

## 2019-05-07 NOTE — PROGRESS NOTES
Doing well.  Baby active.   Denies contractions, bleeding, or leakage of fluid.     Discussed:  GBS screening, US for fluid check, PTL, kick count    Plan:  Next visit:   GBS PCR   US for presentation and fluid check    Return to office in 1 weeks for prenatal care and as needed.    MAYCO Manley, CNM

## 2019-05-14 ENCOUNTER — PRENATAL OFFICE VISIT (OUTPATIENT)
Dept: OBGYN | Facility: OTHER | Age: 24
End: 2019-05-14
Attending: ADVANCED PRACTICE MIDWIFE
Payer: COMMERCIAL

## 2019-05-14 VITALS
WEIGHT: 197 LBS | BODY MASS INDEX: 36.25 KG/M2 | SYSTOLIC BLOOD PRESSURE: 105 MMHG | DIASTOLIC BLOOD PRESSURE: 64 MMHG | HEIGHT: 62 IN

## 2019-05-14 DIAGNOSIS — Z34.83 ENCOUNTER FOR SUPERVISION OF OTHER NORMAL PREGNANCY, THIRD TRIMESTER: Primary | ICD-10-CM

## 2019-05-14 PROCEDURE — G0463 HOSPITAL OUTPT CLINIC VISIT: HCPCS

## 2019-05-14 PROCEDURE — 87653 STREP B DNA AMP PROBE: CPT | Mod: ZL | Performed by: ADVANCED PRACTICE MIDWIFE

## 2019-05-14 PROCEDURE — G0463 HOSPITAL OUTPT CLINIC VISIT: HCPCS | Mod: 25

## 2019-05-14 PROCEDURE — 76815 OB US LIMITED FETUS(S): CPT | Mod: TC | Performed by: ADVANCED PRACTICE MIDWIFE

## 2019-05-14 PROCEDURE — 99207 ZZC PRENATAL VISIT: CPT | Mod: 25 | Performed by: ADVANCED PRACTICE MIDWIFE

## 2019-05-14 ASSESSMENT — PAIN SCALES - GENERAL: PAINLEVEL: NO PAIN (0)

## 2019-05-14 ASSESSMENT — MIFFLIN-ST. JEOR: SCORE: 1591.84

## 2019-05-14 NOTE — PATIENT INSTRUCTIONS
Return to office in 1 week(s) for prenatal care and as needed.    If you think your bag of water is broke; have bleeding like a period; think your in labor; or are worried about your baby's movement; please call the labor and delivery unit @ [phone].    Thank you for allowing Wade MAO CNM and our OB team to participate in your care.  If you have a scheduling or an appointment question please contact [facility] Unit Coordinator at their direct line [phone].   ALL nursing questions or concerns can be directed to your OB nurse at: [phone] Liz Stephenson/Summer

## 2019-05-14 NOTE — PROGRESS NOTES
Doing well.  Baby active.   Denies contractions, bleeding, or leakage of fluid.     Discussed:  Pedal edema, GBS screening, PTL, kick count    US:  Cephalic.  Single pocket > 2 cm    Plan:  US for presentation and fluid check  GBS PCR      Return to office in 1 weeks for prenatal care and as needed.    MAYCO Manley, CNM

## 2019-05-14 NOTE — NURSING NOTE
"Chief Complaint   Patient presents with     Prenatal Care     35w2d       Initial /64 (BP Location: Left arm, Patient Position: Chair, Cuff Size: Adult Regular)   Ht 1.575 m (5' 2\")   Wt 89.4 kg (197 lb)   LMP 09/09/2018   BMI 36.03 kg/m   Estimated body mass index is 36.03 kg/m  as calculated from the following:    Height as of this encounter: 1.575 m (5' 2\").    Weight as of this encounter: 89.4 kg (197 lb).  Medication Reconciliation: complete    Seema Silva LPN    "

## 2019-05-15 LAB
GP B STREP DNA SPEC QL NAA+PROBE: NEGATIVE
SPECIMEN SOURCE: NORMAL

## 2019-05-23 ENCOUNTER — PRENATAL OFFICE VISIT (OUTPATIENT)
Dept: OBGYN | Facility: OTHER | Age: 24
End: 2019-05-23
Attending: ADVANCED PRACTICE MIDWIFE
Payer: COMMERCIAL

## 2019-05-23 VITALS
SYSTOLIC BLOOD PRESSURE: 112 MMHG | WEIGHT: 196 LBS | DIASTOLIC BLOOD PRESSURE: 72 MMHG | HEIGHT: 62 IN | BODY MASS INDEX: 36.07 KG/M2

## 2019-05-23 DIAGNOSIS — Z34.83 ENCOUNTER FOR SUPERVISION OF OTHER NORMAL PREGNANCY, THIRD TRIMESTER: Primary | ICD-10-CM

## 2019-05-23 PROCEDURE — 99207 ZZC PRENATAL VISIT: CPT | Performed by: ADVANCED PRACTICE MIDWIFE

## 2019-05-23 PROCEDURE — G0463 HOSPITAL OUTPT CLINIC VISIT: HCPCS

## 2019-05-23 ASSESSMENT — PAIN SCALES - GENERAL: PAINLEVEL: NO PAIN (0)

## 2019-05-23 ASSESSMENT — MIFFLIN-ST. JEOR: SCORE: 1587.3

## 2019-05-23 NOTE — PROGRESS NOTES
Doing well.  Baby active.   Denies bleeding, or leakage of fluid. Contractions and pressure daily    Discussed:  Labor, when to go to hospital, kick count    Plan:  EFW @ 38 w    Return to office in 1 weeks for prenatal care and as needed.    MAYCO Manley, CNM

## 2019-05-23 NOTE — PATIENT INSTRUCTIONS
Return to office in 1 week(s) for prenatal care and as needed.    If you think your bag of water is broke; have bleeding like a period; think your in labor; or are worried about your baby's movement; please call the labor and delivery unit @ [phone].    Thank you for allowing Wade MAO CNM and our OB team to participate in your care.  If you have a scheduling or an appointment question please contact [facility] Unit Coordinator at their direct line [phone].   ALL nursing questions or concerns can be directed to your OB nurse at: [phone] Liz Stephenson/Summer

## 2019-05-23 NOTE — NURSING NOTE
"Chief Complaint   Patient presents with     Prenatal Care     36w4d       Initial /72 (BP Location: Left arm, Cuff Size: Adult Regular)   Ht 1.575 m (5' 2\")   Wt 88.9 kg (196 lb)   LMP 09/09/2018   BMI 35.85 kg/m   Estimated body mass index is 35.85 kg/m  as calculated from the following:    Height as of this encounter: 1.575 m (5' 2\").    Weight as of this encounter: 88.9 kg (196 lb).  Medication Reconciliation: complete    Lakeisha Gonzalez LPN  "

## 2019-05-28 ENCOUNTER — HOSPITAL ENCOUNTER (OUTPATIENT)
Facility: HOSPITAL | Age: 24
Discharge: HOME | End: 2019-05-28
Attending: ADVANCED PRACTICE MIDWIFE | Admitting: ADVANCED PRACTICE MIDWIFE
Payer: COMMERCIAL

## 2019-05-28 VITALS
BODY MASS INDEX: 37.19 KG/M2 | RESPIRATION RATE: 16 BRPM | DIASTOLIC BLOOD PRESSURE: 56 MMHG | HEART RATE: 77 BPM | WEIGHT: 197 LBS | HEIGHT: 61 IN | SYSTOLIC BLOOD PRESSURE: 117 MMHG | TEMPERATURE: 97.5 F | OXYGEN SATURATION: 98 %

## 2019-05-28 PROBLEM — Z36.89 ENCOUNTER FOR TRIAGE IN PREGNANT PATIENT: Status: ACTIVE | Noted: 2019-05-28

## 2019-05-28 LAB
ALBUMIN UR-MCNC: 10 MG/DL
AMPHETAMINES UR QL: NOT DETECTED NG/ML
APPEARANCE UR: CLEAR
BACTERIA #/AREA URNS HPF: ABNORMAL /HPF
BARBITURATES UR QL SCN: NOT DETECTED NG/ML
BENZODIAZ UR QL SCN: NOT DETECTED NG/ML
BILIRUB UR QL STRIP: NEGATIVE
BUPRENORPHINE UR QL: NOT DETECTED NG/ML
CANNABINOIDS UR QL: NOT DETECTED NG/ML
COCAINE UR QL SCN: NOT DETECTED NG/ML
COLOR UR AUTO: ABNORMAL
D-METHAMPHET UR QL: NOT DETECTED NG/ML
GLUCOSE UR STRIP-MCNC: NEGATIVE MG/DL
HGB UR QL STRIP: NEGATIVE
KETONES UR STRIP-MCNC: NEGATIVE MG/DL
LEUKOCYTE ESTERASE UR QL STRIP: ABNORMAL
METHADONE UR QL SCN: NOT DETECTED NG/ML
MUCOUS THREADS #/AREA URNS LPF: PRESENT /LPF
NITRATE UR QL: NEGATIVE
OPIATES UR QL SCN: NOT DETECTED NG/ML
OXYCODONE UR QL SCN: NOT DETECTED NG/ML
PCP UR QL SCN: NOT DETECTED NG/ML
PH UR STRIP: 6.5 PH (ref 4.7–8)
PROPOXYPH UR QL: NOT DETECTED NG/ML
RBC #/AREA URNS AUTO: <1 /HPF (ref 0–2)
SOURCE: ABNORMAL
SP GR UR STRIP: 1.02 (ref 1–1.03)
SQUAMOUS #/AREA URNS AUTO: 5 /HPF (ref 0–1)
TRICYCLICS UR QL SCN: NOT DETECTED NG/ML
UROBILINOGEN UR STRIP-MCNC: NORMAL MG/DL (ref 0–2)
WBC #/AREA URNS AUTO: 1 /HPF (ref 0–5)

## 2019-05-28 PROCEDURE — G0463 HOSPITAL OUTPT CLINIC VISIT: HCPCS | Mod: 25

## 2019-05-28 PROCEDURE — 59025 FETAL NON-STRESS TEST: CPT | Mod: 26 | Performed by: ADVANCED PRACTICE MIDWIFE

## 2019-05-28 PROCEDURE — 80306 DRUG TEST PRSMV INSTRMNT: CPT | Performed by: ADVANCED PRACTICE MIDWIFE

## 2019-05-28 PROCEDURE — 59025 FETAL NON-STRESS TEST: CPT

## 2019-05-28 PROCEDURE — 81001 URINALYSIS AUTO W/SCOPE: CPT | Performed by: ADVANCED PRACTICE MIDWIFE

## 2019-05-28 ASSESSMENT — MIFFLIN-ST. JEOR: SCORE: 1575.97

## 2019-05-29 NOTE — DISCHARGE INSTRUCTIONS
False Labor  If your pregnancy is at 37 weeks or longer and you are having contractions that are not true labor, you are having false labor. This means that it is not time yet to give birth to your baby.  True labor contractions can start unevenly but soon take on a regular pattern. As time goes on, the contractions will get stronger. Also, the intervals between the contractions will get shorter. Even at the onset, these contractions last at least 30 seconds and may increase to a minute. True labor contractions often start in the back and then move to the front.  False labor contractions can be strong, frequent, and painful, but there is no regular pattern. The intensity can vary from strong to mild to strong again. False labor contractions are most often felt in the front. While true labor contractions don t stop no matter what you are doing, false labor contractions may stop on their own or when you rest or move around.  False labor contractions might make you feel anxious or lose sleep. However, they don t mean that you are sick or that anything is wrong with your baby. You don t need to take any medicine for false labor.  Sometimes, it may be too hard to tell false labor from true labor. In such cases, you may need to have a vaginal exam. This allows your healthcare provider to check for changes in the cervix that only occur with true labor.  Home care    It may help to drink plenty of water and take warm baths. Do what you can ahead of time to prepare for giving birth so you ll have less to worry about later.    Keep a record of your contractions. Write down what time each one starts and how long it lasts. A stopwatch is helpful. Look for the pattern of regularly spaced out contractions with a gradual increase in the time each one lasts.    Don t be embarrassed about going to the hospital with a  false alarm.  Think of it as good practice for the real thing.    Follow-up care  Follow up with your healthcare  provider, or as advised. If you are very worried, confused, can t eat or sleep, or have questions about your health or pregnancy, schedule an appointment with your provider.  When to seek medical advice  Call your healthcare provider right away if any of these occur:    You are fewer than 37 weeks along in your pregnancy and you re having contractions.    You have contractions that are regular, getting longer, stronger, and closer together.    Your water breaks.    You have vaginal bleeding.    You feel a decrease in your baby s movement or any other unusual changes.     You re not sure if you are having false or true labor.  Date Last Reviewed: 6/1/2018 2000-2018 [employer]. [address] [place] [address], [place] [address]. All rights reserved. This information is not intended as a substitute for professional medical care. Always follow your healthcare professional's instructions.

## 2019-05-29 NOTE — PLAN OF CARE
Christelle DEVANG Cope  Pt arrived ambulatory in no acute distress. Pt reports lower back cramping since 2pm today. Denies any leaking of fluids, or bleeding. Upon cervical check dilation is a 1.       NST:  reactive  Start:2100  Stop:2120    Physician: Wade Bauer  Reason For Test: R/O labor  EDC:06/16/19  Gestational Age: 37W2D    Comments:  Notified Wade Bauer of patients arrival. Reported pt having contractions about every 6 minutes . FHT is in the 120's. VSS. Per Wade will continue to monitor for 2 hours and recheck pt and if no change Wade states she can be discharged to home. Pt comfortable in room and declines any needs at this time.  Pt has remained the same with no further discomfort and no change in cervical dilation. Pt denies  pain and reports mild cramping in her lower back at this time.  Discussed signs of early labor with patient. She verbalizes understanding and would like to go home for now. Patient will call and return when labor starts progressing. Discussed with patient to call or come back to the hospital  if her water breaks or if she notices bleeding or when contractions are more painful. Patient aware and educated on when to come back to the unit.  Discharged to home with significant other ambulatory and in no acute distress. Discharged at 2330.      Bhavani Licea

## 2019-05-30 ENCOUNTER — PRENATAL OFFICE VISIT (OUTPATIENT)
Dept: OBGYN | Facility: OTHER | Age: 24
End: 2019-05-30
Attending: ADVANCED PRACTICE MIDWIFE
Payer: COMMERCIAL

## 2019-05-30 VITALS
WEIGHT: 195 LBS | SYSTOLIC BLOOD PRESSURE: 114 MMHG | BODY MASS INDEX: 36.82 KG/M2 | DIASTOLIC BLOOD PRESSURE: 70 MMHG | HEIGHT: 61 IN

## 2019-05-30 DIAGNOSIS — Z34.83 ENCOUNTER FOR SUPERVISION OF OTHER NORMAL PREGNANCY, THIRD TRIMESTER: Primary | ICD-10-CM

## 2019-05-30 PROCEDURE — G0463 HOSPITAL OUTPT CLINIC VISIT: HCPCS

## 2019-05-30 PROCEDURE — 99207 ZZC PRENATAL VISIT: CPT | Performed by: ADVANCED PRACTICE MIDWIFE

## 2019-05-30 ASSESSMENT — PAIN SCALES - GENERAL: PAINLEVEL: MILD PAIN (3)

## 2019-05-30 ASSESSMENT — MIFFLIN-ST. JEOR: SCORE: 1566.89

## 2019-05-30 NOTE — PROGRESS NOTES
Doing well.  Baby active.   Denies bleeding, or leakage of fluid. Reports some contractions and lower back and hip pain.    Discussed:  Labor, when to go to hospital, positioning to relief back pain, kick count    Plan:  EFW on 6/3/19    Return to office in 1 weeks for prenatal care and as needed.    MAYCO Manley, CNM

## 2019-05-30 NOTE — PATIENT INSTRUCTIONS
Return to office in 1 week(s) for prenatal care and as needed.    If you think your bag of water is broke; have bleeding like a period; think your in labor; or are worried about your baby's movement; please call the labor and delivery unit @ [phone].    Thank you for allowing Wade MAO CNM and our OB team to participate in your care.  If you have a scheduling or an appointment question please contact [facility] Unit Coordinator at their direct line [phone].   ALL nursing questions or concerns can be directed to your OB nurse at: [phone] Liz Stephenson/Summer

## 2019-05-30 NOTE — NURSING NOTE
"Chief Complaint   Patient presents with     Prenatal Care     37w4d       Initial /70 (BP Location: Left arm, Patient Position: Chair, Cuff Size: Adult Regular)   Ht 1.549 m (5' 1\")   Wt 88.5 kg (195 lb)   LMP 09/09/2018   BMI 36.84 kg/m   Estimated body mass index is 36.84 kg/m  as calculated from the following:    Height as of this encounter: 1.549 m (5' 1\").    Weight as of this encounter: 88.5 kg (195 lb).  Medication Reconciliation: complete    Seema Silva LPN    "

## 2019-06-02 NOTE — PROGRESS NOTES
Fetal Non-Stress Test Results    NST Ordered By: Wade Bauer    NST Medical Indication: R/O labor    NST Start & Stop Times  NST Start Time: 2100  NST Stop Time: 2120                            NST Results  Fetus A   Baseline Rate: 125  Accelerations: Present  Decelerations: None  Interpretation: reactive

## 2019-06-03 ENCOUNTER — HOSPITAL ENCOUNTER (OUTPATIENT)
Dept: ULTRASOUND IMAGING | Facility: HOSPITAL | Age: 24
Discharge: HOME | End: 2019-06-03
Attending: ADVANCED PRACTICE MIDWIFE | Admitting: ADVANCED PRACTICE MIDWIFE
Payer: COMMERCIAL

## 2019-06-03 DIAGNOSIS — Z34.83 ENCOUNTER FOR SUPERVISION OF OTHER NORMAL PREGNANCY, THIRD TRIMESTER: ICD-10-CM

## 2019-06-03 PROCEDURE — 76816 OB US FOLLOW-UP PER FETUS: CPT | Mod: TC

## 2019-06-06 ENCOUNTER — PRENATAL OFFICE VISIT (OUTPATIENT)
Dept: OBGYN | Facility: OTHER | Age: 24
End: 2019-06-06
Attending: ADVANCED PRACTICE MIDWIFE
Payer: COMMERCIAL

## 2019-06-06 VITALS
HEART RATE: 89 BPM | WEIGHT: 200 LBS | DIASTOLIC BLOOD PRESSURE: 62 MMHG | OXYGEN SATURATION: 99 % | BODY MASS INDEX: 37.79 KG/M2 | SYSTOLIC BLOOD PRESSURE: 118 MMHG

## 2019-06-06 DIAGNOSIS — Z34.83 ENCOUNTER FOR SUPERVISION OF OTHER NORMAL PREGNANCY, THIRD TRIMESTER: Primary | ICD-10-CM

## 2019-06-06 PROCEDURE — 99207 ZZC PRENATAL VISIT: CPT | Performed by: ADVANCED PRACTICE MIDWIFE

## 2019-06-06 PROCEDURE — G0463 HOSPITAL OUTPT CLINIC VISIT: HCPCS

## 2019-06-06 ASSESSMENT — PAIN SCALES - GENERAL: PAINLEVEL: MILD PAIN (2)

## 2019-06-06 NOTE — PROGRESS NOTES
Doing well.  Baby active.   Denies bleeding, or leakage of fluid. Contractions    Discussed:  Concerns with baby's size, EFW, OB visit last week, labor, kick count    SVE:  3 cm/ 40 % effaced/ -3 station, posterior, soft     Sneed score:  5    Pt desires IOL    Plan:  Recheck cervix next visit  Consider IOL     Return to office in 1 weeks for prenatal care and as needed.    Wade Bauer, MAYCO, CNM

## 2019-06-06 NOTE — NURSING NOTE
"Chief Complaint   Patient presents with     Prenatal Care       Initial /62 (BP Location: Right arm, Patient Position: Chair, Cuff Size: Adult Regular)   Pulse 89   Wt 90.7 kg (200 lb)   LMP 09/09/2018   SpO2 99%   BMI 37.79 kg/m   Estimated body mass index is 37.79 kg/m  as calculated from the following:    Height as of 5/30/19: 1.549 m (5' 1\").    Weight as of this encounter: 90.7 kg (200 lb).  Medication Reconciliation: complete    Kourtney Chicas LPN    "

## 2019-06-06 NOTE — PATIENT INSTRUCTIONS
Return to office in 1 week(s) for prenatal care and as needed.    If you think your bag of water is broke; have bleeding like a period; think your in labor; or are worried about your baby's movement; please call the labor and delivery unit @ [phone].    Thank you for allowing Wade MAO CNM and our OB team to participate in your care.  If you have a scheduling or an appointment question please contact [facility] Unit Coordinator at their direct line [phone].   ALL nursing questions or concerns can be directed to your OB nurse at: [phone] Liz Stephenson/Summer

## 2019-06-09 ENCOUNTER — HOSPITAL ENCOUNTER (OUTPATIENT)
Facility: HOSPITAL | Age: 24
Discharge: HOME | End: 2019-06-09
Attending: OBSTETRICS & GYNECOLOGY | Admitting: OBSTETRICS & GYNECOLOGY
Payer: COMMERCIAL

## 2019-06-09 ENCOUNTER — TELEPHONE (OUTPATIENT)
Dept: OBGYN | Facility: HOSPITAL | Age: 24
End: 2019-06-09

## 2019-06-09 VITALS
BODY MASS INDEX: 37.76 KG/M2 | RESPIRATION RATE: 18 BRPM | TEMPERATURE: 97.8 F | HEIGHT: 61 IN | WEIGHT: 200 LBS | OXYGEN SATURATION: 98 % | SYSTOLIC BLOOD PRESSURE: 117 MMHG | DIASTOLIC BLOOD PRESSURE: 88 MMHG | HEART RATE: 85 BPM

## 2019-06-09 LAB
ALBUMIN UR-MCNC: NEGATIVE MG/DL
AMPHETAMINES UR QL: NOT DETECTED NG/ML
APPEARANCE UR: ABNORMAL
BACTERIA #/AREA URNS HPF: ABNORMAL /HPF
BARBITURATES UR QL SCN: NOT DETECTED NG/ML
BENZODIAZ UR QL SCN: NOT DETECTED NG/ML
BILIRUB UR QL STRIP: NEGATIVE
BUPRENORPHINE UR QL: NOT DETECTED NG/ML
CANNABINOIDS UR QL: NOT DETECTED NG/ML
COCAINE UR QL SCN: NOT DETECTED NG/ML
COLOR UR AUTO: YELLOW
D-METHAMPHET UR QL: NOT DETECTED NG/ML
GLUCOSE UR STRIP-MCNC: NEGATIVE MG/DL
HGB UR QL STRIP: NEGATIVE
KETONES UR STRIP-MCNC: NEGATIVE MG/DL
LEUKOCYTE ESTERASE UR QL STRIP: ABNORMAL
METHADONE UR QL SCN: NOT DETECTED NG/ML
MUCOUS THREADS #/AREA URNS LPF: PRESENT /LPF
NITRATE UR QL: NEGATIVE
OPIATES UR QL SCN: NOT DETECTED NG/ML
OXYCODONE UR QL SCN: NOT DETECTED NG/ML
PCP UR QL SCN: NOT DETECTED NG/ML
PH UR STRIP: 7 PH (ref 4.7–8)
PROPOXYPH UR QL: NOT DETECTED NG/ML
RBC #/AREA URNS AUTO: 1 /HPF (ref 0–2)
SOURCE: ABNORMAL
SP GR UR STRIP: 1.01 (ref 1–1.03)
SQUAMOUS #/AREA URNS AUTO: 10 /HPF (ref 0–1)
TRICYCLICS UR QL SCN: NOT DETECTED NG/ML
UROBILINOGEN UR STRIP-MCNC: NORMAL MG/DL (ref 0–2)
WBC #/AREA URNS AUTO: 3 /HPF (ref 0–5)

## 2019-06-09 PROCEDURE — G0463 HOSPITAL OUTPT CLINIC VISIT: HCPCS | Mod: 25

## 2019-06-09 PROCEDURE — 80306 DRUG TEST PRSMV INSTRMNT: CPT | Performed by: OBSTETRICS & GYNECOLOGY

## 2019-06-09 PROCEDURE — 81001 URINALYSIS AUTO W/SCOPE: CPT | Mod: 59 | Performed by: OBSTETRICS & GYNECOLOGY

## 2019-06-09 PROCEDURE — 25000132 ZZH RX MED GY IP 250 OP 250 PS 637: Performed by: OBSTETRICS & GYNECOLOGY

## 2019-06-09 PROCEDURE — 59025 FETAL NON-STRESS TEST: CPT | Mod: 26 | Performed by: OBSTETRICS & GYNECOLOGY

## 2019-06-09 PROCEDURE — 59025 FETAL NON-STRESS TEST: CPT

## 2019-06-09 RX ORDER — DIPHENHYDRAMINE HCL 25 MG
50 CAPSULE ORAL
Status: COMPLETED | OUTPATIENT
Start: 2019-06-09 | End: 2019-06-09

## 2019-06-09 RX ADMIN — DIPHENHYDRAMINE HYDROCHLORIDE 50 MG: 25 CAPSULE ORAL at 13:43

## 2019-06-09 ASSESSMENT — MIFFLIN-ST. JEOR: SCORE: 1589.57

## 2019-06-09 NOTE — PLAN OF CARE
Christelle DEVANG Cope        NST:  reactive  Start: 1258  Stop: 1342    Physician: Dr Muñoz for Wade ROBB  Reason For Test: R/O labor, abdominal sensitivity   EDC:6/16/19  Gestational Age: 39 0/7    Comments:  Pt here with reports of contractions every 7 minutes and also reported sensitivity to abdomen.  NST reactive.  UA/UDS collected.  Dr Muñoz called and informed of lab results, SVE and pt status. Order obtained for benadryl and order to discharge pt home.  AVS reviewed, pt verbalized understanding and is aware to call with any questions or concerns.  PT discharged ambulatory at 1355 with spouse.         Chary Chaves

## 2019-06-09 NOTE — DISCHARGE INSTRUCTIONS
Discharge Instructions for Undelivered Patients    Diet:  * Drink 8 to 12 glasses of liquids (milk, juice, water) every day  * You may eat meals and snacks.    Activity:  * Count fetal kicks every day.  * Call your doctor if your baby is moving less than usual.    Call your provider if you notice:  * Swelling in your face or increased swelling in your hands or legs.  * Headaches that are not relieved by Tylenol (acetaminophen).  * Changes in your vision (blurring; seeing spots or stars).  * Nausea (sick to your stomach) and vomiting (throwing up).  * Weight gain of 5 pounds per week.  * Heartburn that doesn't go away.  * Signs of bladder infection: Pain when you urinate (use the toilet), needing to go more often or more urgently.  * The bag of gutierrez (membrane) breaks, or you notice leaking in your underwear.  * Bright red blood in your underwear.  * Abdominal (lower belly) or stomach pain.  * Second (plus) baby: Contractions (tightenings) less than 10 minutes apart and getting stronger.  * Increase or change in vaginal discharge (note the color and amount).       [facility]: [phone]

## 2019-06-09 NOTE — TELEPHONE ENCOUNTER
2019 11:20 AM  Christelle Cope 1995 [phone] (home)   Pt of Dr: No data on file.   Estimated Date of Delivery: 2019 39w0d    Patient Active Problem List   Diagnosis     Back pain      (spontaneous vaginal delivery)     Need for prophylactic vaccination and inoculation against other viral diseases(V04.89)     Need for prophylactic vaccination and inoculation against influenza     Encounter for routine gynecological examination     Routine general medical examination at a health care facility     Papanicolaou smear of cervix with low grade squamous intraepithelial lesion (LGSIL)     Insufficient endocervical or transformation zone component in cervical specimen     NO SHOW     Status post cholecystectomy     Papanicolaou smear of cervix with atypical squamous cells of undetermined significance (ASC-US)     Encounter for supervision of other normal pregnancy, unspecified trimester     Encounter for triage in pregnant patient         Spoke with Christelle Cope   Pt lives [place] away.  Reason for call per pt  Pt stated that her stomach is tender to touch and has been having also having back pain which is constant.  Reports rolling her ankle yesterday so isn't sure if she just overdid it.  Are you having contractions? Unsure she stated she is a having back pain, but it is constant  Are you having any bloody show: No  Are you leaking any fluids/ has your water broken? No  Is your baby moving normally: Yes  Did you have any complications with this or a previous pregnancy? No    Backache: Yes  Pressure: No  Vaginal discharge? Pt reported having mucous last week but nothing abnormal currently.   Cervical Status:  3/50% on Thursday in the clinic   Patient advised: That she is welcome to come to the hospital and be checked PRN.  She stated she would talk to her  and call me back to inform me if she wants to come in and be monitored.      Call taken by Chary Chaves

## 2019-06-09 NOTE — PLAN OF CARE
OB Triage Note  Christelle Cope  MRN: [id number]  Gestational Age: 39w0d      Christelle Cope presents for sensitivity to abdomen as well as contractions occurring every 7 minutes, which started at 1230.  Pt stated she has been xavi on and off since Thursday.  (sign/symptom/concern).      States xavi since 1230.  Rates pain at 3/10.  Denies and leaking of fluid or vaginal bleeding.  Dr Bueno. notified of arrival and condition.  Oriented patient to surroundings. Call light within reach.     FHT: reassuring.   Uterine Assessment:Contractions: frequency q 6-9 minutes, Strength mild and Duration 60-90 seconds  Plan:  -Initial NST, then fetal/uterine monitoring per MD/patient plan.  -Sterile vaginal exam/sterile speculum exam.  -Nursing education on plan of care provided.

## 2019-06-11 ENCOUNTER — PRENATAL OFFICE VISIT (OUTPATIENT)
Dept: OBGYN | Facility: OTHER | Age: 24
End: 2019-06-11
Attending: ADVANCED PRACTICE MIDWIFE
Payer: COMMERCIAL

## 2019-06-11 VITALS
DIASTOLIC BLOOD PRESSURE: 68 MMHG | BODY MASS INDEX: 37.76 KG/M2 | SYSTOLIC BLOOD PRESSURE: 112 MMHG | HEIGHT: 61 IN | WEIGHT: 200 LBS

## 2019-06-11 DIAGNOSIS — Z34.83 ENCOUNTER FOR SUPERVISION OF OTHER NORMAL PREGNANCY, THIRD TRIMESTER: Primary | ICD-10-CM

## 2019-06-11 DIAGNOSIS — Z34.80 ENCOUNTER FOR SUPERVISION OF OTHER NORMAL PREGNANCY, UNSPECIFIED TRIMESTER: ICD-10-CM

## 2019-06-11 PROCEDURE — 59426 ANTEPARTUM CARE ONLY: CPT | Performed by: ADVANCED PRACTICE MIDWIFE

## 2019-06-11 PROCEDURE — G0463 HOSPITAL OUTPT CLINIC VISIT: HCPCS

## 2019-06-11 ASSESSMENT — PAIN SCALES - GENERAL: PAINLEVEL: MILD PAIN (3)

## 2019-06-11 ASSESSMENT — MIFFLIN-ST. JEOR: SCORE: 1589.57

## 2019-06-11 NOTE — PATIENT INSTRUCTIONS
Return to office in 1 week(s) for prenatal care and as needed.    If you think your bag of water is broke; have bleeding like a period; think your in labor; or are worried about your baby's movement; please call the labor and delivery unit @ [phone].    Thank you for allowing Wade MAO CNM and our OB team to participate in your care.  If you have a scheduling or an appointment question please contact [facility] Unit Coordinator at their direct line [phone].   ALL nursing questions or concerns can be directed to your OB nurse at: [phone] Liz Stephenson/Summer

## 2019-06-11 NOTE — NURSING NOTE
"Chief Complaint   Patient presents with     Prenatal Care     39w2d       Initial /68 (BP Location: Left arm, Patient Position: Chair, Cuff Size: Adult Regular)   Ht 1.549 m (5' 1\")   Wt 90.7 kg (200 lb)   LMP 09/09/2018   BMI 37.79 kg/m   Estimated body mass index is 37.79 kg/m  as calculated from the following:    Height as of this encounter: 1.549 m (5' 1\").    Weight as of this encounter: 90.7 kg (200 lb).  Medication Reconciliation: complete    Seema Silva LPN    "

## 2019-06-11 NOTE — PROGRESS NOTES
Doing well.  Baby active.   Denies bleeding, or leakage of fluid. Contractions throughout the day    Discussed:  Labor, when to go to hospital, kick count    SVE:  3 cm/ 50 % effaced/ -3 station posterior, firm  Sneed Score:  3    Plan:  Consider IOL for post dates @ 41w    Return to office in 1 weeks for prenatal care and as needed.    Wade Bauer, APRN, CNM

## 2019-06-15 ENCOUNTER — TELEPHONE (OUTPATIENT)
Dept: OBGYN | Facility: OTHER | Age: 24
End: 2019-06-15
Payer: COMMERCIAL

## 2019-06-15 ENCOUNTER — TRANSFERRED RECORDS (OUTPATIENT)
Dept: HEALTH INFORMATION MANAGEMENT | Facility: CLINIC | Age: 24
End: 2019-06-15

## 2019-06-21 ENCOUNTER — ALLIED HEALTH/NURSE VISIT (OUTPATIENT)
Dept: OBGYN | Facility: OTHER | Age: 24
End: 2019-06-21
Attending: ADVANCED PRACTICE MIDWIFE
Payer: COMMERCIAL

## 2019-06-21 DIAGNOSIS — Z48.02 VISIT FOR SUTURE REMOVAL: Primary | ICD-10-CM

## 2019-06-21 NOTE — PROGRESS NOTES
Incision is clean, dry and intact. Staples removed without difficulty. Benzoin and steri-strips placed.    Temp:  98.7  Pain?  0  Bleeding?  Light bleeding   Appetite?  No issue with appetite   Mood?  Happy  Breast or bottle feeding?  Breast     Instructed to remove steri-strips when baby is 2 weeks old; can drive at 10 days postop; and gradually return activity level to normal between now and 6 weeks postpartum with exception of no sex, heavy lifting or impact. Will return for postpartum check.6/21/2019    Patient has phone number to call if questions or concerns between now and next appointment.     Seema Silva

## 2019-06-25 ENCOUNTER — OFFICE VISIT (OUTPATIENT)
Dept: OBGYN | Facility: OTHER | Age: 24
End: 2019-06-25
Attending: OBSTETRICS & GYNECOLOGY
Payer: COMMERCIAL

## 2019-06-25 ENCOUNTER — TELEPHONE (OUTPATIENT)
Dept: FAMILY MEDICINE | Facility: OTHER | Age: 24
End: 2019-06-25

## 2019-06-25 VITALS
SYSTOLIC BLOOD PRESSURE: 106 MMHG | BODY MASS INDEX: 37.79 KG/M2 | HEIGHT: 61 IN | TEMPERATURE: 98.7 F | DIASTOLIC BLOOD PRESSURE: 68 MMHG

## 2019-06-25 DIAGNOSIS — T81.49XA SURGICAL SITE INFECTION: ICD-10-CM

## 2019-06-25 DIAGNOSIS — B99.9 INFECTION: Primary | ICD-10-CM

## 2019-06-25 PROCEDURE — 99213 OFFICE O/P EST LOW 20 MIN: CPT | Performed by: ADVANCED PRACTICE MIDWIFE

## 2019-06-25 PROCEDURE — G0463 HOSPITAL OUTPT CLINIC VISIT: HCPCS

## 2019-06-25 RX ORDER — CLINDAMYCIN HCL 150 MG
150 CAPSULE ORAL 4 TIMES DAILY
Qty: 40 CAPSULE | Refills: 0 | Status: SHIPPED | OUTPATIENT
Start: 2019-06-25 | End: 2019-08-15

## 2019-06-25 RX ORDER — OXYCODONE HYDROCHLORIDE 5 MG/1
5 TABLET ORAL
COMMUNITY
Start: 2019-06-18 | End: 2019-07-02

## 2019-06-25 RX ORDER — ACETAMINOPHEN 500 MG
1000 TABLET ORAL
COMMUNITY
Start: 2019-06-18

## 2019-06-25 RX ORDER — IBUPROFEN 800 MG/1
800 TABLET, FILM COATED ORAL
COMMUNITY
Start: 2019-06-18 | End: 2019-12-02

## 2019-06-25 ASSESSMENT — PAIN SCALES - GENERAL: PAINLEVEL: MILD PAIN (2)

## 2019-06-25 NOTE — PROGRESS NOTES
"Christelle Cope is a 24 year old female  /68 (BP Location: Left arm, Patient Position: Chair, Cuff Size: Adult Regular)   Temp 98.7  F (37.1  C) (Tympanic)   Ht 1.549 m (5' 1\")   LMP 09/09/2018   BMI 37.79 kg/m    Pleasant without acute distress  Incision:  Slightly reddened and warm to touch.  Without drainage.     Breast feeding:  y        Baby name: Mychal     Primary C/S  Bleeding:  light    Pt report incision feels \"sore/tender\".  PCS on 6/15/19  Staple removal on 6/21/19      Assessment:    PP 10 days  Post-op PCS   Incision tender, reddened, and warm to touch  Appt with OB/GYN @ [facility] on 6/28/19    Plan:  Phone Consult with Dr. Black   Clindamycin 150 mg PO 4 x a day for 10 days  Return to office: as scheduled next week    Greater than 15 minutes with 10 minutes spent with this patient on incision site care, signs of infection, medication instructions, and follow up care.  Wade MAO CNM    "

## 2019-06-25 NOTE — NURSING NOTE
"Chief Complaint   Patient presents with     ER F/U     incision check        Initial /68 (BP Location: Left arm, Patient Position: Chair, Cuff Size: Adult Regular)   Temp 98.7  F (37.1  C) (Tympanic)   Ht 1.549 m (5' 1\")   LMP 09/09/2018   BMI 37.79 kg/m   Estimated body mass index is 37.79 kg/m  as calculated from the following:    Height as of this encounter: 1.549 m (5' 1\").    Weight as of 6/11/19: 90.7 kg (200 lb).  Medication Reconciliation: complete    Seema Silva LPN         "

## 2019-06-25 NOTE — PATIENT INSTRUCTIONS
Return to office in one week for postpartum care and as needed.    Thank you for allowing Wade MAO CNM and our OB team to participate in your care.  If you have a scheduling or an appointment question please contact [facility] Unit Coordinator at their direct line [phone].   ALL nursing questions or concerns can be directed to your OB nurse at: [phone] Liz Stephenson/Summer

## 2019-06-25 NOTE — TELEPHONE ENCOUNTER
Sutures removed last Friday   Hot to touch and red   Noticed this morning- [facility] did the C section.      Went to urgent care- and refused service who states needs to see OB.    Wants to be seen.    Spoke to Nurse for Wade Bauer/Wade Bauer and they will work her in right now.  Patient notified and will come to clinic.

## 2019-07-02 ENCOUNTER — PRENATAL OFFICE VISIT (OUTPATIENT)
Dept: OBGYN | Facility: OTHER | Age: 24
End: 2019-07-02
Attending: ADVANCED PRACTICE MIDWIFE
Payer: COMMERCIAL

## 2019-07-02 VITALS
BODY MASS INDEX: 31.91 KG/M2 | HEIGHT: 61 IN | WEIGHT: 169 LBS | SYSTOLIC BLOOD PRESSURE: 104 MMHG | DIASTOLIC BLOOD PRESSURE: 62 MMHG

## 2019-07-02 PROCEDURE — G0463 HOSPITAL OUTPT CLINIC VISIT: HCPCS

## 2019-07-02 PROCEDURE — 99207 ZZC NO CHARGE LOS: CPT | Performed by: ADVANCED PRACTICE MIDWIFE

## 2019-07-02 ASSESSMENT — ANXIETY QUESTIONNAIRES
1. FEELING NERVOUS, ANXIOUS, OR ON EDGE: NOT AT ALL
6. BECOMING EASILY ANNOYED OR IRRITABLE: NOT AT ALL
7. FEELING AFRAID AS IF SOMETHING AWFUL MIGHT HAPPEN: NOT AT ALL
IF YOU CHECKED OFF ANY PROBLEMS ON THIS QUESTIONNAIRE, HOW DIFFICULT HAVE THESE PROBLEMS MADE IT FOR YOU TO DO YOUR WORK, TAKE CARE OF THINGS AT HOME, OR GET ALONG WITH OTHER PEOPLE: NOT DIFFICULT AT ALL
2. NOT BEING ABLE TO STOP OR CONTROL WORRYING: NOT AT ALL
5. BEING SO RESTLESS THAT IT IS HARD TO SIT STILL: NOT AT ALL
3. WORRYING TOO MUCH ABOUT DIFFERENT THINGS: NOT AT ALL
GAD7 TOTAL SCORE: 1
4. TROUBLE RELAXING: SEVERAL DAYS

## 2019-07-02 ASSESSMENT — MIFFLIN-ST. JEOR: SCORE: 1448.96

## 2019-07-02 ASSESSMENT — PATIENT HEALTH QUESTIONNAIRE - PHQ9: SUM OF ALL RESPONSES TO PHQ QUESTIONS 1-9: 2

## 2019-07-02 ASSESSMENT — PAIN SCALES - GENERAL: PAINLEVEL: NO PAIN (0)

## 2019-07-02 NOTE — NURSING NOTE
"Chief Complaint   Patient presents with     Post Partum Exam     2 wk PP        Initial /62 (BP Location: Left arm, Patient Position: Chair, Cuff Size: Adult Regular)   Ht 1.549 m (5' 1\")   Wt 76.7 kg (169 lb)   LMP 09/09/2018   Breastfeeding? Unknown   BMI 31.93 kg/m   Estimated body mass index is 31.93 kg/m  as calculated from the following:    Height as of this encounter: 1.549 m (5' 1\").    Weight as of this encounter: 76.7 kg (169 lb).  Medication Reconciliation: complete    "

## 2019-07-02 NOTE — PATIENT INSTRUCTIONS
Return to office in 4 weeks for postpartum care and as needed.    Thank you for allowing Wade MAO CNM and our OB team to participate in your care.  If you have a scheduling or an appointment question please contact [facility] Unit Coordinator at their direct line [phone].   ALL nursing questions or concerns can be directed to your OB nurse at: [phone] Liz Stephenson/Summer

## 2019-07-02 NOTE — PROGRESS NOTES
"Christelle Cope is a 24 year old female  /62 (BP Location: Left arm, Patient Position: Chair, Cuff Size: Adult Regular)   Ht 1.549 m (5' 1\")   Wt 76.7 kg (169 lb)   LMP 09/09/2018   Breastfeeding? Unknown   BMI 31.93 kg/m    Pleasant without acute distress  Incision:  Clean.  Discoloration above incision @ hematoma site.    Pt reports incision drained.  Seen by OB/GYN @ [facility]     Breast feeding:  y        Baby name: Mychal   Spontaneous vaginal delivery: n  PCS  PHQ9:  2  Bleeding:  Occasional spotting  Vulva:  n  Family planning:  Mirena IUD @ 8 weeks  Other concerns:  n    Assessment:    PP 2 w  Post-op PCS  Family planning  Breastfedding    Plan:   Continue breastfeeding   Incision clean.  Discoloration  IUD @ 8 w  Return to office: 4 wks for PP care and as needed    Greater than 20 were spent with this patient on routine PP cares  Wade MAO CNM    "

## 2019-07-03 ASSESSMENT — ANXIETY QUESTIONNAIRES: GAD7 TOTAL SCORE: 1

## 2019-07-30 ENCOUNTER — PRENATAL OFFICE VISIT (OUTPATIENT)
Dept: OBGYN | Facility: OTHER | Age: 24
End: 2019-07-30
Attending: ADVANCED PRACTICE MIDWIFE
Payer: COMMERCIAL

## 2019-07-30 VITALS
DIASTOLIC BLOOD PRESSURE: 68 MMHG | OXYGEN SATURATION: 100 % | SYSTOLIC BLOOD PRESSURE: 108 MMHG | WEIGHT: 163.7 LBS | HEIGHT: 61 IN | HEART RATE: 53 BPM | BODY MASS INDEX: 30.91 KG/M2

## 2019-07-30 PROCEDURE — 99207 ZZC POST PARTUM EXAM: CPT | Performed by: ADVANCED PRACTICE MIDWIFE

## 2019-07-30 PROCEDURE — G0463 HOSPITAL OUTPT CLINIC VISIT: HCPCS

## 2019-07-30 ASSESSMENT — ANXIETY QUESTIONNAIRES
1. FEELING NERVOUS, ANXIOUS, OR ON EDGE: NOT AT ALL
7. FEELING AFRAID AS IF SOMETHING AWFUL MIGHT HAPPEN: SEVERAL DAYS
6. BECOMING EASILY ANNOYED OR IRRITABLE: NOT AT ALL
GAD7 TOTAL SCORE: 2
4. TROUBLE RELAXING: NOT AT ALL
3. WORRYING TOO MUCH ABOUT DIFFERENT THINGS: SEVERAL DAYS
2. NOT BEING ABLE TO STOP OR CONTROL WORRYING: NOT AT ALL
5. BEING SO RESTLESS THAT IT IS HARD TO SIT STILL: NOT AT ALL

## 2019-07-30 ASSESSMENT — MIFFLIN-ST. JEOR: SCORE: 1424.92

## 2019-07-30 ASSESSMENT — PAIN SCALES - GENERAL: PAINLEVEL: NO PAIN (0)

## 2019-07-30 ASSESSMENT — PATIENT HEALTH QUESTIONNAIRE - PHQ9: SUM OF ALL RESPONSES TO PHQ QUESTIONS 1-9: 2

## 2019-07-30 NOTE — PROGRESS NOTES
"Christelle Cope is a 24 year old female is 6 weeks post partum  /68 (Cuff Size: Adult Regular)   Pulse 53   Ht 1.549 m (5' 1\")   Wt 74.3 kg (163 lb 11.2 oz)   LMP 09/09/2018   SpO2 100%   BMI 30.93 kg/m    Pleasant   Incision clean     Breast feeding:  y        Baby name: Mychal   Spontaneous vaginal delivery: n   PCS  PHQ9:  2  Bleeding:  n  Vulva:  good  Family planning:  Desires IUD  Other concerns:  n    Pelvic:  Vagina and vulva are normal; well healed, no discharge is noted.    Cervix: normal without lesions.    Uterus:  mobile, normal in size and shape without tenderness.  Adnexa: without masses or tenderness.    Assessment:    PP 6 w  Post-op C/S   Incision clean  Family planning  Desires IUD    Plan:   Pelvic exam  Schedule appt in 2 weeks for IUD insertion     Greater than 20 were spent with this patient PP dagoberto Bauer, MAYCO, CNM    "

## 2019-07-30 NOTE — NURSING NOTE
"Chief Complaint   Patient presents with     Post Partum Exam     6 Week Post Partum       Initial /68 (Cuff Size: Adult Regular)   Pulse 53   Ht 1.549 m (5' 1\")   Wt 74.3 kg (163 lb 11.2 oz)   LMP 09/09/2018   SpO2 100%   BMI 30.93 kg/m   Estimated body mass index is 30.93 kg/m  as calculated from the following:    Height as of this encounter: 1.549 m (5' 1\").    Weight as of this encounter: 74.3 kg (163 lb 11.2 oz).  Medication Reconciliation: complete   Lucia Silva      "

## 2019-07-30 NOTE — PATIENT INSTRUCTIONS
Return to office in 2 weeks for family planning and as needed.    Thank you for allowing Wade MAO CNM and our OB team to participate in your care.  If you have a scheduling or an appointment question please contact [facility] Unit Coordinator at their direct line [phone].   ALL nursing questions or concerns can be directed to your OB nurse at: [phone] Liz Stephenson/Summer

## 2019-07-31 ASSESSMENT — ANXIETY QUESTIONNAIRES: GAD7 TOTAL SCORE: 2

## 2019-08-15 ENCOUNTER — OFFICE VISIT (OUTPATIENT)
Dept: OBGYN | Facility: OTHER | Age: 24
End: 2019-08-15
Attending: ADVANCED PRACTICE MIDWIFE
Payer: COMMERCIAL

## 2019-08-15 VITALS
SYSTOLIC BLOOD PRESSURE: 114 MMHG | WEIGHT: 166 LBS | HEIGHT: 61 IN | DIASTOLIC BLOOD PRESSURE: 71 MMHG | BODY MASS INDEX: 31.34 KG/M2

## 2019-08-15 DIAGNOSIS — Z11.3 SCREEN FOR STD (SEXUALLY TRANSMITTED DISEASE): ICD-10-CM

## 2019-08-15 DIAGNOSIS — Z30.014 ENCOUNTER FOR INITIAL PRESCRIPTION OF INTRAUTERINE CONTRACEPTIVE DEVICE (IUD): Primary | ICD-10-CM

## 2019-08-15 LAB
SPECIMEN SOURCE: NORMAL
WET PREP SPEC: NORMAL

## 2019-08-15 PROCEDURE — G0463 HOSPITAL OUTPT CLINIC VISIT: HCPCS

## 2019-08-15 PROCEDURE — 87491 CHLMYD TRACH DNA AMP PROBE: CPT | Mod: ZL | Performed by: ADVANCED PRACTICE MIDWIFE

## 2019-08-15 PROCEDURE — 87591 N.GONORRHOEAE DNA AMP PROB: CPT | Mod: ZL | Performed by: ADVANCED PRACTICE MIDWIFE

## 2019-08-15 PROCEDURE — 87210 SMEAR WET MOUNT SALINE/INK: CPT | Mod: ZL | Performed by: ADVANCED PRACTICE MIDWIFE

## 2019-08-15 PROCEDURE — 99212 OFFICE O/P EST SF 10 MIN: CPT | Performed by: ADVANCED PRACTICE MIDWIFE

## 2019-08-15 PROCEDURE — G0463 HOSPITAL OUTPT CLINIC VISIT: HCPCS | Mod: 25

## 2019-08-15 ASSESSMENT — MIFFLIN-ST. JEOR: SCORE: 1435.35

## 2019-08-15 ASSESSMENT — PAIN SCALES - GENERAL: PAINLEVEL: NO PAIN (0)

## 2019-08-15 NOTE — NURSING NOTE
"Chief Complaint   Patient presents with     IUD       Initial /71 (BP Location: Left arm, Patient Position: Chair, Cuff Size: Adult Regular)   Ht 1.549 m (5' 1\")   Wt 75.3 kg (166 lb)   LMP 09/09/2018   BMI 31.37 kg/m   Estimated body mass index is 31.37 kg/m  as calculated from the following:    Height as of this encounter: 1.549 m (5' 1\").    Weight as of this encounter: 75.3 kg (166 lb).  Medication Reconciliation: complete    "

## 2019-08-15 NOTE — PROGRESS NOTES
".Christelle Cope is a 24 year old female  Here for family planning.  Desires Mirena IUD.  Unable to insert Mirena IUD past 4-5 cm.  Met resistance.  Will try the She IUD next week.      O:   /71 (BP Location: Left arm, Patient Position: Chair, Cuff Size: Adult Regular)   Ht 1.549 m (5' 1\")   Wt 75.3 kg (166 lb)   LMP 09/09/2018   BMI 31.37 kg/m     Pleasant without acute distress.    ASSESSMENT:  Family planning  Desires IUD  Unable to place Mirena IUD  Breastfeeding    PLAN:  Wet prep, GC/CT  She IUD insertion next week  Continue BF  RTO in 3 weeks      Total visit greater than 15 minutes with 10 minutes spent face to face counseling this patient Mirena IUD risks, benefits, side effects, and effectiveness, difference between the She and the Mirena IUD, explained procedure for insertion.    Wade Bauer, APRN, CNM            "

## 2019-08-15 NOTE — PATIENT INSTRUCTIONS
Return to office in one week for family planning and as needed.    Thank you for allowing Wade MAO CNM and our OB team to participate in your care.  If you have a scheduling or an appointment question please contact [facility] Unit Coordinator at their direct line [phone].   ALL nursing questions or concerns can be directed to your OB nurse at: [phone] Liz Stephenson/Summer

## 2019-08-16 LAB
C TRACH DNA SPEC QL PROBE+SIG AMP: NOT DETECTED
N GONORRHOEA DNA SPEC QL PROBE+SIG AMP: NOT DETECTED
SPECIMEN SOURCE: NORMAL

## 2019-08-22 ENCOUNTER — OFFICE VISIT (OUTPATIENT)
Dept: OBGYN | Facility: OTHER | Age: 24
End: 2019-08-22
Attending: ADVANCED PRACTICE MIDWIFE
Payer: COMMERCIAL

## 2019-08-22 VITALS
BODY MASS INDEX: 30.96 KG/M2 | SYSTOLIC BLOOD PRESSURE: 110 MMHG | DIASTOLIC BLOOD PRESSURE: 68 MMHG | HEIGHT: 61 IN | WEIGHT: 164 LBS

## 2019-08-22 DIAGNOSIS — Z30.9 ENCOUNTER FOR CONTRACEPTIVE MANAGEMENT, UNSPECIFIED TYPE: Primary | ICD-10-CM

## 2019-08-22 PROCEDURE — G0463 HOSPITAL OUTPT CLINIC VISIT: HCPCS | Mod: 25

## 2019-08-22 PROCEDURE — 99213 OFFICE O/P EST LOW 20 MIN: CPT | Performed by: ADVANCED PRACTICE MIDWIFE

## 2019-08-22 PROCEDURE — G0463 HOSPITAL OUTPT CLINIC VISIT: HCPCS

## 2019-08-22 RX ORDER — ACETAMINOPHEN AND CODEINE PHOSPHATE 120; 12 MG/5ML; MG/5ML
0.35 SOLUTION ORAL DAILY
Qty: 90 TABLET | Refills: 4 | Status: SHIPPED | OUTPATIENT
Start: 2019-08-22 | End: 2019-12-02

## 2019-08-22 ASSESSMENT — PAIN SCALES - GENERAL: PAINLEVEL: NO PAIN (0)

## 2019-08-22 ASSESSMENT — MIFFLIN-ST. JEOR: SCORE: 1426.28

## 2019-08-22 NOTE — NURSING NOTE
"Chief Complaint   Patient presents with     IUD       Initial /68 (BP Location: Left arm, Patient Position: Chair, Cuff Size: Adult Regular)   Ht 1.549 m (5' 1\")   Wt 74.4 kg (164 lb)   BMI 30.99 kg/m   Estimated body mass index is 30.99 kg/m  as calculated from the following:    Height as of this encounter: 1.549 m (5' 1\").    Weight as of this encounter: 74.4 kg (164 lb).  Medication Reconciliation: complete    "

## 2019-08-22 NOTE — PATIENT INSTRUCTIONS
Return to office in September for well woman care and as needed.    Thank you for allowing Wade MAO CNM and our OB team to participate in your care.  If you have a scheduling or an appointment question please contact [facility] Unit Coordinator at their direct line [phone].   ALL nursing questions or concerns can be directed to your OB nurse at: [phone] Liz Stephenson/Summer

## 2019-08-22 NOTE — PROGRESS NOTES
"Christelle Cope is a 24 year old female  Here for contraception management.  Pt desires an IUD.  Unable to insert a Mirena last week.  Unsuccessful attempt with She IUD today.  Meet resistance with applicator at 4 cm.      Denies migraines with aura or clotting disorder.      O:   /68 (BP Location: Left arm, Patient Position: Chair, Cuff Size: Adult Regular)   Ht 1.549 m (5' 1\")   Wt 74.4 kg (164 lb)   BMI 30.99 kg/m     Pleasant without acute distress.  Pelvic:  Vagina and vulva are normal;  no discharge is noted.    Cervix: normal without lesions.    Uterus:  mobile, normal in size and shape without tenderness.  Adnexa: without masses or tenderness.    A:   Contraception management   Unsuccessful attempt with She IUD insertion      P:    Pelvic US  Micronor PO daily  Follow up US with GYN referral    Total visit greater than 15 minutes with 10 minutes spent face to face counseling this patient on She IUD, risks, benefits, side effects, effectiveness, explained procedure, pelvic ultrasound, GYN referral, Nuvaring, risks, benefits, side effects, effectiveness, how to place, well woman follow up.    Wade Bauer, MAYCO, CNM      "

## 2019-09-05 ENCOUNTER — HOSPITAL ENCOUNTER (OUTPATIENT)
Dept: ULTRASOUND IMAGING | Facility: HOSPITAL | Age: 24
Discharge: HOME | End: 2019-09-05
Attending: ADVANCED PRACTICE MIDWIFE | Admitting: ADVANCED PRACTICE MIDWIFE
Payer: COMMERCIAL

## 2019-09-05 DIAGNOSIS — Z30.9 ENCOUNTER FOR CONTRACEPTIVE MANAGEMENT, UNSPECIFIED TYPE: ICD-10-CM

## 2019-09-05 PROCEDURE — 76830 TRANSVAGINAL US NON-OB: CPT | Mod: TC

## 2019-09-06 DIAGNOSIS — Z30.430 ENCOUNTER FOR IUD INSERTION: Primary | ICD-10-CM

## 2019-09-12 ENCOUNTER — OFFICE VISIT (OUTPATIENT)
Dept: OBGYN | Facility: OTHER | Age: 24
End: 2019-09-12
Attending: ADVANCED PRACTICE MIDWIFE
Payer: COMMERCIAL

## 2019-09-12 VITALS
DIASTOLIC BLOOD PRESSURE: 64 MMHG | SYSTOLIC BLOOD PRESSURE: 112 MMHG | HEIGHT: 61 IN | BODY MASS INDEX: 30.96 KG/M2 | WEIGHT: 164 LBS

## 2019-09-12 DIAGNOSIS — Z12.4 ENCOUNTER FOR SCREENING FOR CERVICAL CANCER: Primary | ICD-10-CM

## 2019-09-12 PROCEDURE — 99213 OFFICE O/P EST LOW 20 MIN: CPT | Performed by: ADVANCED PRACTICE MIDWIFE

## 2019-09-12 PROCEDURE — G0463 HOSPITAL OUTPT CLINIC VISIT: HCPCS

## 2019-09-12 PROCEDURE — G0123 SCREEN CERV/VAG THIN LAYER: HCPCS | Mod: ZL | Performed by: ADVANCED PRACTICE MIDWIFE

## 2019-09-12 PROCEDURE — G0463 HOSPITAL OUTPT CLINIC VISIT: HCPCS | Mod: 25

## 2019-09-12 ASSESSMENT — PAIN SCALES - GENERAL: PAINLEVEL: NO PAIN (0)

## 2019-09-12 ASSESSMENT — MIFFLIN-ST. JEOR: SCORE: 1426.28

## 2019-09-12 NOTE — NURSING NOTE
"Chief Complaint   Patient presents with     Gyn Exam       Initial /64 (BP Location: Left arm, Patient Position: Chair, Cuff Size: Adult Regular)   Ht 1.549 m (5' 1\")   Wt 74.4 kg (164 lb)   BMI 30.99 kg/m   Estimated body mass index is 30.99 kg/m  as calculated from the following:    Height as of this encounter: 1.549 m (5' 1\").    Weight as of this encounter: 74.4 kg (164 lb).  Medication Reconciliation: complete    "

## 2019-09-12 NOTE — PATIENT INSTRUCTIONS
Return to office in one year for well woman care and as needed.    Thank you for allowing Wade MAO CNM and our OB team to participate in your care.  If you have a scheduling or an appointment question please contact [facility] Unit Coordinator at their direct line [phone].   ALL nursing questions or concerns can be directed to your OB nurse at: [phone] Liz Stephenson/Summer

## 2019-09-12 NOTE — PROGRESS NOTES
"Christelle Cope is a 24 year old female  Here for cervical cancer screening and to review pelvic ultrasound.      O:   /64 (BP Location: Left arm, Patient Position: Chair, Cuff Size: Adult Regular)   Ht 1.549 m (5' 1\")   Wt 74.4 kg (164 lb)   BMI 30.99 kg/m     Pleasant without acute distress  Pelvic:  Vagina and vulva are normal;  no discharge is noted.    Cervix: normal without lesions.    Uterus:  Retroverted and mobile, normal in size and shape without tenderness.  Adnexa: without masses or tenderness.    A:    Need of cervical cancer screening  Pelvic ultrasound unremarkable  Retroverted uterus  Desires IUD  Using condoms    P:    Pap  Review pelvic US  GYN referral for IUD    Total visit greater than 15 minutes with 10 minutes spent face to face counseling this patient cervical cancer screening, review of pelvic ultrasound, retroverted uterus, IUD, GYN referra.    Wade Bauer, MAYCO, CNM    "

## 2019-09-17 LAB
COPATH REPORT: NORMAL
PAP: NORMAL

## 2019-10-02 ENCOUNTER — OFFICE VISIT (OUTPATIENT)
Dept: OBGYN | Facility: OTHER | Age: 24
End: 2019-10-02
Attending: OBSTETRICS & GYNECOLOGY
Payer: COMMERCIAL

## 2019-10-02 VITALS
HEART RATE: 80 BPM | WEIGHT: 159 LBS | SYSTOLIC BLOOD PRESSURE: 100 MMHG | BODY MASS INDEX: 30.04 KG/M2 | DIASTOLIC BLOOD PRESSURE: 60 MMHG

## 2019-10-02 DIAGNOSIS — Z30.09 FAMILY PLANNING: ICD-10-CM

## 2019-10-02 DIAGNOSIS — Z30.430 ENCOUNTER FOR INTRAUTERINE DEVICE PLACEMENT: Primary | ICD-10-CM

## 2019-10-02 LAB — HCG UR QL: NEGATIVE

## 2019-10-02 PROCEDURE — G0463 HOSPITAL OUTPT CLINIC VISIT: HCPCS | Mod: 25

## 2019-10-02 PROCEDURE — 81025 URINE PREGNANCY TEST: CPT | Mod: ZL | Performed by: OBSTETRICS & GYNECOLOGY

## 2019-10-02 PROCEDURE — 58300 INSERT INTRAUTERINE DEVICE: CPT | Performed by: OBSTETRICS & GYNECOLOGY

## 2019-10-02 ASSESSMENT — PAIN SCALES - GENERAL: PAINLEVEL: NO PAIN (0)

## 2019-10-02 NOTE — PROGRESS NOTES
Christelle Cope is a 24 year old  who has been previously counseled and is here for a Mirena IUD. Previous attempts x 2 have failed.    ROS:  Constitutional, neuro, endocrine, gastrointestinal, genitourinary and psychiatric systems are otherwise negative..    EXAM  /60 (BP Location: Left arm, Patient Position: Sitting, Cuff Size: Adult Regular)   Pulse 80   Wt 72.1 kg (159 lb)   BMI 30.04 kg/m    Gen - NAD  Abdomen- Soft, non-tender  External genitalia- No lesions, normal anatomy  Vagina- No lesions, no abnormal discharge  Cervix- No lesions  Uterus- Normal size, regular contour, retroverted  Adnexa- No masses, no tenderness      Procedure: Cervix was swabbed with betadine then grasped with tenaculum. Uterus was sounded. IUD placed at uterine fundus. Instruments removed. No complications. Good hemostasis.      A/P Mirena IUD placement   IUD placed   Pt will check strings and RTC if not palpable     PEBBLES POSADAS MD

## 2019-11-27 NOTE — PROGRESS NOTES
Christelle Cope is a 24 year old female who presents to clinic today for the following health issues:        Depression and Anxiety Follow-Up    How are you doing with your depression since your last visit? No change    How are you doing with your anxiety since your last visit?  No change    Are you having other symptoms that might be associated with depression or anxiety? No    Have you had a significant life event? OTHER: mother is in hospice     Do you have any concerns with your use of alcohol or other drugs? No        Social History     Tobacco Use     Smoking status: Never Smoker     Smokeless tobacco: Never Used   Substance Use Topics     Alcohol use: No     Alcohol/week: 0.0 standard drinks     Drug use: No       PHQ 7/2/2019 7/30/2019 12/2/2019   PHQ-9 Total Score 2 2 4   Q9: Thoughts of better off dead/self-harm past 2 weeks Not at all Not at all Not at all     ALEXEY-7 SCORE 7/2/2019 7/30/2019 12/2/2019   Total Score 1 2 4     Last PHQ-9 12/2/2019   1.  Little interest or pleasure in doing things 0   2.  Feeling down, depressed, or hopeless 0   3.  Trouble falling or staying asleep, or sleeping too much 0   4.  Feeling tired or having little energy 2   5.  Poor appetite or overeating 2   6.  Feeling bad about yourself 0   7.  Trouble concentrating 0   8.  Moving slowly or restless 0   Q9: Thoughts of better off dead/self-harm past 2 weeks 0   PHQ-9 Total Score 4   Difficulty at work, home, or with people Not difficult at all     ALEXEY-7  12/2/2019   1. Feeling nervous, anxious, or on edge 1   2. Not being able to stop or control worrying 0   3. Worrying too much about different things 0   4. Trouble relaxing 2   5. Being so restless that it is hard to sit still 0   6. Becoming easily annoyed or irritable 1   7. Feeling afraid, as if something awful might happen 0   ALEXEY-7 Total Score 4   If you checked any problems, how difficult have they made it for you to do your work, take care of things at home, or get  along with other people? Not difficult at all       Suicide Assessment Five-step Evaluation and Treatment (SAFE-T)        Migraine     Since your last clinic visit, how have your headaches changed?  Worsened    How often are you getting headaches or migraines? 2 times a week     Are you able to do normal daily activities when you have a migraine? Yes    Are you taking rescue/relief medications? (Select all that apply) ibuprofen (Advil, Motrin)    How helpful is your rescue/relief medication?  The relief is inconsistent    Are you taking any medications to prevent migraines? (Select all that apply)  No    In the past 4 weeks, how often have you gone to urgent care or the emergency room because of your headaches?  0            How many servings of fruits and vegetables do you eat daily?  2-3    On average, how many sweetened beverages do you drink each day (Examples: soda, juice, sweet tea, etc.  Do NOT count diet or artificially sweetened beverages)?   0    How many days per week do you miss taking your medication? 0        Patient Active Problem List   Diagnosis     Back pain     Migraine without aura and without status migrainosus, not intractable     Situational depression     Past Surgical History:   Procedure Laterality Date      SECTION  06/15/2019     CHOLECYSTECTOMY       GALLBLADDER SURGERY         Social History     Tobacco Use     Smoking status: Never Smoker     Smokeless tobacco: Never Used   Substance Use Topics     Alcohol use: No     Alcohol/week: 0.0 standard drinks     Family History   Problem Relation Age of Onset     Cystic Fibrosis Other         FAMILY H/O     Musculoskeletal Disorder Other         family h/o muscular dystrophy     Depression Mother      Mental Illness Mother      Migraines Mother      Depression Father      Mental Illness Father      Learning Disorder Father      Depression Brother      Mental Illness Brother      Depression Sister      Mental Illness Sister       "Diabetes No family hx of      Hypertension No family hx of      C.A.D. No family hx of      Cerebrovascular Disease No family hx of      Autoimmune Disease No family hx of      Colon Cancer No family hx of      Breast Cancer No family hx of      Ovarian Cancer No family hx of          Current Outpatient Medications   Medication Sig Dispense Refill     rizatriptan (MAXALT) 10 MG tablet Take 1 tablet (10 mg) by mouth at onset of headache for migraine May repeat in 2 hours. Max 3 tablets/24 hours. 18 tablet 1     acetaminophen (TYLENOL) 500 MG tablet Take 1,000 mg by mouth       EPINEPHrine (EPIPEN IJ) Inject  as directed.       Misc. Devices MISC 1 Device       Prenatal Vit-Fe Fumarate-FA (PRENATAL MULTIVITAMIN PLUS IRON) 27-0.8 MG TABS per tablet Take 1 tablet by mouth daily       Allergies   Allergen Reactions     Bees Swelling     Pollen Extract      Trees Difficulty breathing     Suture Rash     Pt had dissolvable sutures for perineal repair after first pregnancy and she stated she had a rash from waist to knees and had to come back after discharge and have the sutures removed and non-dissolvable suture placed.      Recent Labs   Lab Test 08/30/18  1102 06/25/13  1941   TSH 1.48 1.48      BP Readings from Last 3 Encounters:   12/02/19 110/68   10/02/19 100/60   09/12/19 112/64    Wt Readings from Last 3 Encounters:   12/02/19 75.9 kg (167 lb 4.8 oz)   10/02/19 72.1 kg (159 lb)   09/12/19 74.4 kg (164 lb)               Reviewed and updated as needed this visit by Provider         Review of Systems   ROS COMP: Constitutional, HEENT, cardiovascular, pulmonary, GI, , musculoskeletal, neuro, skin, endocrine and psych systems are negative, except as otherwise noted.        Objective    /68 (BP Location: Left arm, Patient Position: Sitting, Cuff Size: Adult Regular)   Pulse 69   Temp 98.3  F (36.8  C) (Tympanic)   Resp 16   Ht 1.549 m (5' 1\")   Wt 75.9 kg (167 lb 4.8 oz)   SpO2 97%   BMI 31.61 kg/m    Body " mass index is 31.61 kg/m .         Physical Exam   GENERAL: healthy, alert and no distress  EYES: Eyes grossly normal to inspection, PERRL and conjunctivae and sclerae normal  HENT: ear canals and TM's normal, nose and mouth without ulcers or lesions  NECK: no adenopathy, no asymmetry, masses, or scars and thyroid normal to palpation  RESP: lungs clear to auscultation - no rales, rhonchi or wheezes  CV: regular rate and rhythm, normal S1 S2, no S3 or S4, no murmur, click or rub, no peripheral edema and peripheral pulses strong  SKIN: no suspicious lesions or rashes  NEURO: Normal strength and tone, mentation intact and speech normal  PSYCH: mentation appears normal, affect normal/bright          Assessment & Plan     1. Encounter to establish care  - Chart updated    2. Need for prophylactic vaccination and inoculation against influenza  - Vaccine Administration, Initial [46384]    3. Migraine without aura and without status migrainosus, not intractable  - rizatriptan (MAXALT) 10 MG tablet; Take 1 tablet (10 mg) by mouth at onset of headache for migraine May repeat in 2 hours. Max 3 tablets/24 hours.  Dispense: 18 tablet; Refill: 1    4. Situational depression  - See attached action plan  - Please follow-up as needed      Return in about 6 months (around 6/2/2020).       Monica Ashley CNP  [facility]

## 2019-12-02 ENCOUNTER — OFFICE VISIT (OUTPATIENT)
Dept: FAMILY MEDICINE | Facility: OTHER | Age: 24
End: 2019-12-02
Attending: NURSE PRACTITIONER
Payer: COMMERCIAL

## 2019-12-02 VITALS
HEART RATE: 69 BPM | OXYGEN SATURATION: 97 % | RESPIRATION RATE: 16 BRPM | BODY MASS INDEX: 31.59 KG/M2 | DIASTOLIC BLOOD PRESSURE: 68 MMHG | TEMPERATURE: 98.3 F | WEIGHT: 167.3 LBS | HEIGHT: 61 IN | SYSTOLIC BLOOD PRESSURE: 110 MMHG

## 2019-12-02 DIAGNOSIS — Z23 NEED FOR PROPHYLACTIC VACCINATION AND INOCULATION AGAINST INFLUENZA: Primary | ICD-10-CM

## 2019-12-02 DIAGNOSIS — G43.009 MIGRAINE WITHOUT AURA AND WITHOUT STATUS MIGRAINOSUS, NOT INTRACTABLE: ICD-10-CM

## 2019-12-02 DIAGNOSIS — Z76.89 ENCOUNTER TO ESTABLISH CARE: ICD-10-CM

## 2019-12-02 DIAGNOSIS — F43.21 SITUATIONAL DEPRESSION: ICD-10-CM

## 2019-12-02 PROBLEM — Z34.80 ENCOUNTER FOR SUPERVISION OF OTHER NORMAL PREGNANCY, UNSPECIFIED TRIMESTER: Status: RESOLVED | Noted: 2018-11-20 | Resolved: 2019-12-02

## 2019-12-02 PROBLEM — Z90.49 STATUS POST CHOLECYSTECTOMY: Status: RESOLVED | Noted: 2017-06-19 | Resolved: 2019-12-02

## 2019-12-02 PROBLEM — Z36.89 ENCOUNTER FOR TRIAGE IN PREGNANT PATIENT: Status: RESOLVED | Noted: 2019-05-28 | Resolved: 2019-12-02

## 2019-12-02 PROBLEM — T81.49XA SURGICAL SITE INFECTION: Status: RESOLVED | Noted: 2019-06-25 | Resolved: 2019-12-02

## 2019-12-02 PROBLEM — R87.610 PAPANICOLAOU SMEAR OF CERVIX WITH ATYPICAL SQUAMOUS CELLS OF UNDETERMINED SIGNIFICANCE (ASC-US): Status: RESOLVED | Noted: 2017-06-26 | Resolved: 2019-12-02

## 2019-12-02 PROCEDURE — 90686 IIV4 VACC NO PRSV 0.5 ML IM: CPT

## 2019-12-02 PROCEDURE — 99203 OFFICE O/P NEW LOW 30 MIN: CPT | Performed by: NURSE PRACTITIONER

## 2019-12-02 PROCEDURE — G0463 HOSPITAL OUTPT CLINIC VISIT: HCPCS

## 2019-12-02 PROCEDURE — 90471 IMMUNIZATION ADMIN: CPT | Performed by: NURSE PRACTITIONER

## 2019-12-02 PROCEDURE — G0463 HOSPITAL OUTPT CLINIC VISIT: HCPCS | Mod: 25

## 2019-12-02 RX ORDER — RIZATRIPTAN BENZOATE 10 MG/1
10 TABLET ORAL
Qty: 18 TABLET | Refills: 1 | Status: SHIPPED | OUTPATIENT
Start: 2019-12-02

## 2019-12-02 ASSESSMENT — PATIENT HEALTH QUESTIONNAIRE - PHQ9
SUM OF ALL RESPONSES TO PHQ QUESTIONS 1-9: 4
5. POOR APPETITE OR OVEREATING: MORE THAN HALF THE DAYS

## 2019-12-02 ASSESSMENT — ANXIETY QUESTIONNAIRES
6. BECOMING EASILY ANNOYED OR IRRITABLE: SEVERAL DAYS
7. FEELING AFRAID AS IF SOMETHING AWFUL MIGHT HAPPEN: NOT AT ALL
GAD7 TOTAL SCORE: 4
5. BEING SO RESTLESS THAT IT IS HARD TO SIT STILL: NOT AT ALL
2. NOT BEING ABLE TO STOP OR CONTROL WORRYING: NOT AT ALL
3. WORRYING TOO MUCH ABOUT DIFFERENT THINGS: NOT AT ALL
1. FEELING NERVOUS, ANXIOUS, OR ON EDGE: SEVERAL DAYS
IF YOU CHECKED OFF ANY PROBLEMS ON THIS QUESTIONNAIRE, HOW DIFFICULT HAVE THESE PROBLEMS MADE IT FOR YOU TO DO YOUR WORK, TAKE CARE OF THINGS AT HOME, OR GET ALONG WITH OTHER PEOPLE: NOT DIFFICULT AT ALL

## 2019-12-02 ASSESSMENT — MIFFLIN-ST. JEOR: SCORE: 1441.25

## 2019-12-02 ASSESSMENT — PAIN SCALES - GENERAL: PAINLEVEL: NO PAIN (0)

## 2019-12-02 NOTE — LETTER
My Depression Action Plan  Name: Christelle Cope   Date of Birth 1995  Date: 12/2/2019    My doctor: Monica Ashley   My clinic: [facility]  [address]  [facility] [address]  [phone]          GREEN    ZONE   Good Control    What it looks like:     Things are going generally well. You have normal up s and down s. You may even feel depressed from time to time, but bad moods usually last less than a day.   What you need to do:  1. Continue to care for yourself (see self care plan)  2. Check your depression survival kit and update it as needed  3. Follow your physician s recommendations including any medication.  4. Do not stop taking medication unless you consult with your physician first.           YELLOW         ZONE Getting Worse    What it looks like:     Depression is starting to interfere with your life.     It may be hard to get out of bed; you may be starting to isolate yourself from others.    Symptoms of depression are starting to last most all day and this has happened for several days.     You may have suicidal thoughts but they are not constant.   What you need to do:     1. Call your care team, your response to treatment will improve if you keep your care team informed of your progress. Yellow periods are signs an adjustment may need to be made.     2. Continue your self-care, even if you have to fake it!    3. Talk to someone in your support network    4. Open up your depression survival kit           RED    ZONE Medical Alert - Get Help    What it looks like:     Depression is seriously interfering with your life.     You may experience these or other symptoms: You can t get out of bed most days, can t work or engage in other necessary activities, you have trouble taking care of basic hygiene, or basic responsibilities, thoughts of suicide or death that will not go away, self-injurious behavior.     What you need to do:  1. Call your care team  and request a same-day appointment. If they are not available (weekends or after hours) call your local crisis line, emergency room or 911.            Depression Self Care Plan / Survival Kit    Self-Care for Depression  Here s the deal. Your body and mind are really not as separate as most people think.  What you do and think affects how you feel and how you feel influences what you do and think. This means if you do things that people who feel good do, it will help you feel better.  Sometimes this is all it takes.  There is also a place for medication and therapy depending on how severe your depression is, so be sure to consult with your medical provider and/ or Behavioral Health Consultant if your symptoms are worsening or not improving.     In order to better manage my stress, I will:    Exercise  Get some form of exercise, every day. This will help reduce pain and release endorphins, the  feel good  chemicals in your brain. This is almost as good as taking antidepressants!  This is not the same as joining a gym and then never going! (they count on that by the way ) It can be as simple as just going for a walk or doing some gardening, anything that will get you moving.      Hygiene   Maintain good hygiene (Get out of bed in the morning, Make your bed, Brush your teeth, Take a shower, and Get dressed like you were going to work, even if you are unemployed).  If your clothes don't fit try to get ones that do.    Diet  I will strive to eat foods that are good for me, drink plenty of water, and avoid excessive sugar, caffeine, alcohol, and other mood-altering substances.  Some foods that are helpful in depression are: complex carbohydrates, B vitamins, flaxseed, fish or fish oil, fresh fruits and vegetables.    Psychotherapy  I agree to participate in Individual Therapy (if recommended).    Medication  If prescribed medications, I agree to take them.  Missing doses can result in serious side effects.  I understand  that drinking alcohol, or other illicit drug use, may cause potential side effects.  I will not stop my medication abruptly without first discussing it with my provider.    Staying Connected With Others  I will stay in touch with my friends, family members, and my primary care provider/team.    Use your imagination  Be creative.  We all have a creative side; it doesn t matter if it s oil painting, sand castles, or mud pies! This will also kick up the endorphins.    Witness Beauty  (AKA stop and smell the roses) Take a look outside, even in mid-winter. Notice colors, textures. Watch the squirrels and birds.     Service to others  Be of service to others.  There is always someone else in need.  By helping others we can  get out of ourselves  and remember the really important things.  This also provides opportunities for practicing all the other parts of the program.    Humor  Laugh and be silly!  Adjust your TV habits for less news and crime-drama and more comedy.    Control your stress  Try breathing deep, massage therapy, biofeedback, and meditation. Find time to relax each day.     My support system    Clinic Contact:  Phone number:    Contact 1:  Phone number:    Contact 2:  Phone number:    [facility]/:  Phone number:    Therapist:  Phone number:    Local crisis center:    Phone number:    Other community support:  Phone number:

## 2019-12-02 NOTE — LETTER
My Migraine Action Plan      Date: 12/2/2019     My Name: Christelle Cope   YOB: 1995  My Pharmacy: [id number] - [place], [place] - [address]  AT [facility] [address]       My (Preventative) Control Medicine:  None        My Rescue Medicine: Maxalt   My Doctor: Monica Ashley     My Clinic: [facility]  [address]  [facility] [address]  [phone]        GREEN ZONE = Good Control    My headache plan is working.   I can do what I need to do.           I WILL:     ? Keep managing my triggers.  ? Write in my migraine diary each time I have a headache.  ? Keep taking my preventive (controller) medicine daily.  ? Take my relief and rescue medicine as needed.             YELLOW ZONE = Not Enough Control    My headache plan isn t always working.   My headaches keep me from doing   some of the things I need to do.       I WILL:     ? Set goals to control my triggers and act on them.  ? Write in my migraine diary each time I have a headache and review it for                      patterns or new triggers.  ? Keep taking my preventive (controller) medicine daily.  ? Take my relief and rescue medicine as needed.  ? Call my doctor or clinic at if I stay in the Yellow Zone.             RED ZONE = Poor or No Control    My headache plan has  failed. I can t do anything  when I have one. My  medicines aren t working.           I WILL:   ? Set goals to control my triggers and act on them.  ? Write in my migraine diary each time I have a headache and review it for                      patterns or new triggers.  ? Keep taking my preventive (controller) medicine daily.  ? Take my relief and rescue medicine as needed.  ? Call my doctor or clinic or go to urgent care or an ER if I m having the worst                  headache of my life.  ? Call my doctor or clinic or go to urgent care or an ER if my medicine doesn t work.  ? Let my doctor or clinic know within 2  weeks if I have gone to an urgent care or             emergency department.          Provider specific instructions:  No

## 2019-12-02 NOTE — NURSING NOTE
"Chief Complaint   Patient presents with     Establish Care       Initial /68 (BP Location: Left arm, Patient Position: Sitting, Cuff Size: Adult Regular)   Pulse 69   Temp 98.3  F (36.8  C) (Tympanic)   Resp 16   Ht 1.549 m (5' 1\")   Wt 75.9 kg (167 lb 4.8 oz)   SpO2 97%   BMI 31.61 kg/m   Estimated body mass index is 31.61 kg/m  as calculated from the following:    Height as of this encounter: 1.549 m (5' 1\").    Weight as of this encounter: 75.9 kg (167 lb 4.8 oz).  Medication Reconciliation: complete  Dulce Conteh MA  "

## 2019-12-02 NOTE — PATIENT INSTRUCTIONS
Assessment & Plan     1. Encounter to establish care  - Chart updated    2. Need for prophylactic vaccination and inoculation against influenza  - Vaccine Administration, Initial [13969]    3. Migraine without aura and without status migrainosus, not intractable  - rizatriptan (MAXALT) 10 MG tablet; Take 1 tablet (10 mg) by mouth at onset of headache for migraine May repeat in 2 hours. Max 3 tablets/24 hours.  Dispense: 18 tablet; Refill: 1    4. Situational depression  - See attached action plan  - Please follow-up as needed          Return in about 6 months (around 6/2/2020).       Monica Ashley, CNP  [facility]

## 2019-12-03 ASSESSMENT — ANXIETY QUESTIONNAIRES: GAD7 TOTAL SCORE: 4

## 2020-01-16 ENCOUNTER — NURSE TRIAGE (OUTPATIENT)
Dept: FAMILY MEDICINE | Facility: OTHER | Age: 25
End: 2020-01-16

## 2020-01-16 NOTE — TELEPHONE ENCOUNTER
"Patient calling and reports having a sore throat that started today. Patient has a productive cough and is coughing up green phlegm, and has a headache and congestion. Patient denies fever, difficulty breathing, chest pain, nausea, or vomiting. Patient's main concern was the sore throat. Patient has not taken any OTC medication. Per protocol patient is to treat at home. Advised to drink fluids, warm broth, suck on hard candy or throat lozenge, gargle salt water 4x/day, tylenol/ibuprofen for fever/pain, and eat a soft diet. Advised to call back if symptoms worsen, fever last over 3 days, sore throat/cold lasts over 5 days. Patient verbalizes understanding.    Additional Information    Negative: Severe difficulty breathing (e.g., struggling for each breath, speaks in single words, stridor)    Negative: Sounds like a life-threatening emergency to the triager    Negative: [1] Diagnosed strep throat AND [2] taking antibiotic AND [3] symptoms continue    Negative: Throat culture results, call about    Negative: Productive cough is main symptom    Negative: Non-productive cough is main symptom    Negative: Hoarseness is main symptom    Negative: Runny nose is main symptom    Negative: [1] Drooling or spitting out saliva (because can't swallow) AND [2] normal breathing    Negative: Unable to open mouth completely    Negative: [1] Difficulty breathing AND [2] not severe    Negative: Fever > 104 F (40 C)    Negative: [1] Refuses to drink anything AND [2] for > 12 hours    Negative: [1] Drinking very little AND [2] dehydration suspected (e.g., no urine > 12 hours, very dry mouth, very lightheaded)    Negative: Patient sounds very sick or weak to the triager    Negative: SEVERE (e.g., excruciating) throat pain    Negative: [1] Pus on tonsils (back of throat) AND [2]  fever AND [3] swollen neck lymph nodes (\"glands\")    Negative: [1] Rash AND [2] widespread (especially chest and abdomen)    Negative: Earache also present    " "Negative: Fever present > 3 days (72 hours)    Negative: Diabetes mellitus or weak immune system (e.g., HIV positive, cancer chemo, splenectomy, organ transplant)    Negative: History of rheumatic fever    Negative: [1] Adult is leaving on a trip AND [2] requests an antibiotic NOW    Negative: [1] Positive throat culture or rapid strep test (according to lab, PCP, caller, etc.) AND [2] NO  standing order to call in prescription for antibiotic    Negative: [1] Exposure to family member (or spouse or boyfriend/girlfriend) with test-proven strep AND [2] within last 10 days    Negative: [1] Sore throat is the only symptom AND [2] present > 48 hours    Negative: [1] Sore throat with cough/cold symptoms AND [2] present > 5 days    [1] Sore throat is the only symptom AND [2] sore throat present < 48 hours    Answer Assessment - Initial Assessment Questions  1. ONSET: \"When did the throat start hurting?\" (Hours or days ago)       This morning   2. SEVERITY: \"How bad is the sore throat?\" (Scale 1-10; mild, moderate or severe)    - MILD (1-3):  doesn't interfere with eating or normal activities    - MODERATE (4-7): interferes with eating some solids and normal activities    - SEVERE (8-10):  excruciating pain, interferes with most normal activities    - SEVERE DYSPHAGIA: can't swallow liquids, drooling      Moderate  3. STREP EXPOSURE: \"Has there been any exposure to strep within the past week?\" If so, ask: \"What type of contact occurred?\"       No  4. VIRAL SYMPTOMS: \"Are there any symptoms of a cold, such as a runny nose, cough, hoarse voice or red eyes?\"       Coughing up green phlegm, nose is stuffed, not running,   5. FEVER: \"Do you have a fever?\" If so, ask: \"What is your temperature, how was it measured, and when did it start?\"      No. Did not check  6. PUS ON THE TONSILS: \"Is there pus on the tonsils in the back of your throat?\"      No  7. OTHER SYMPTOMS: \"Do you have any other symptoms?\" (e.g., difficulty " "breathing, headache, rash)      Headache  8. PREGNANCY: \"Is there any chance you are pregnant?\" \"When was your last menstrual period?\"      No    Protocols used: SORE THROAT-A-AH      "

## 2020-02-26 ENCOUNTER — TRANSFERRED RECORDS (OUTPATIENT)
Dept: HEALTH INFORMATION MANAGEMENT | Facility: CLINIC | Age: 25
End: 2020-02-26

## 2020-12-14 ENCOUNTER — HEALTH MAINTENANCE LETTER (OUTPATIENT)
Age: 25
End: 2020-12-14

## 2021-10-02 ENCOUNTER — HEALTH MAINTENANCE LETTER (OUTPATIENT)
Age: 26
End: 2021-10-02

## 2022-01-22 ENCOUNTER — HEALTH MAINTENANCE LETTER (OUTPATIENT)
Age: 27
End: 2022-01-22

## 2022-09-04 ENCOUNTER — HEALTH MAINTENANCE LETTER (OUTPATIENT)
Age: 27
End: 2022-09-04

## 2023-04-29 ENCOUNTER — HEALTH MAINTENANCE LETTER (OUTPATIENT)
Age: 28
End: 2023-04-29